# Patient Record
Sex: FEMALE | Race: WHITE | NOT HISPANIC OR LATINO | Employment: UNEMPLOYED | URBAN - METROPOLITAN AREA
[De-identification: names, ages, dates, MRNs, and addresses within clinical notes are randomized per-mention and may not be internally consistent; named-entity substitution may affect disease eponyms.]

---

## 2017-02-21 ENCOUNTER — TRANSCRIBE ORDERS (OUTPATIENT)
Dept: ADMINISTRATIVE | Facility: HOSPITAL | Age: 72
End: 2017-02-21

## 2017-02-21 DIAGNOSIS — R10.9 ABDOMINAL PAIN, UNSPECIFIED SITE: Primary | ICD-10-CM

## 2017-02-22 ENCOUNTER — APPOINTMENT (OUTPATIENT)
Dept: LAB | Facility: HOSPITAL | Age: 72
End: 2017-02-22
Payer: MEDICARE

## 2017-02-22 ENCOUNTER — HOSPITAL ENCOUNTER (OUTPATIENT)
Dept: CT IMAGING | Facility: HOSPITAL | Age: 72
Discharge: HOME | End: 2017-02-22
Payer: MEDICARE

## 2017-02-22 ENCOUNTER — TRANSCRIBE ORDERS (OUTPATIENT)
Dept: ADMINISTRATIVE | Facility: HOSPITAL | Age: 72
End: 2017-02-22

## 2017-02-22 DIAGNOSIS — R10.9 ABDOMINAL PAIN, UNSPECIFIED SITE: ICD-10-CM

## 2017-02-22 DIAGNOSIS — R10.9 ABDOMINAL PAIN, UNSPECIFIED SITE: Primary | ICD-10-CM

## 2017-02-22 LAB
BUN SERPL-MCNC: 12 MG/DL (ref 5–25)
CREAT SERPL-MCNC: 0.99 MG/DL (ref 0.6–1.3)
GFR SERPL CREATININE-BSD FRML MDRD: 55 ML/MIN/1.73SQ M

## 2017-02-22 PROCEDURE — 36415 COLL VENOUS BLD VENIPUNCTURE: CPT

## 2017-02-22 PROCEDURE — 84520 ASSAY OF UREA NITROGEN: CPT

## 2017-02-22 PROCEDURE — 74177 CT ABD & PELVIS W/CONTRAST: CPT

## 2017-02-22 PROCEDURE — 82565 ASSAY OF CREATININE: CPT

## 2017-02-22 RX ADMIN — IOHEXOL 50 ML: 240 INJECTION, SOLUTION INTRATHECAL; INTRAVASCULAR; INTRAVENOUS; ORAL at 12:08

## 2017-02-22 RX ADMIN — IOHEXOL 100 ML: 350 INJECTION, SOLUTION INTRAVENOUS at 12:08

## 2017-03-08 ENCOUNTER — TRANSCRIBE ORDERS (OUTPATIENT)
Dept: ADMINISTRATIVE | Facility: HOSPITAL | Age: 72
End: 2017-03-08

## 2017-03-08 DIAGNOSIS — Q63.8 OTHER SPECIFIED CONGENITAL ANOMALY OF KIDNEY: Primary | ICD-10-CM

## 2017-03-08 DIAGNOSIS — M54.30 SCIATICA, UNSPECIFIED LATERALITY: ICD-10-CM

## 2017-03-08 DIAGNOSIS — M54.12 BRACHIAL NEURITIS OR RADICULITIS NOS: Primary | ICD-10-CM

## 2017-03-20 ENCOUNTER — HOSPITAL ENCOUNTER (OUTPATIENT)
Dept: ULTRASOUND IMAGING | Facility: HOSPITAL | Age: 72
Discharge: HOME | End: 2017-03-20
Payer: MEDICARE

## 2017-03-20 ENCOUNTER — HOSPITAL ENCOUNTER (OUTPATIENT)
Dept: MRI IMAGING | Facility: HOSPITAL | Age: 72
Discharge: HOME | End: 2017-03-20
Payer: MEDICARE

## 2017-03-20 ENCOUNTER — HOSPITAL ENCOUNTER (OUTPATIENT)
Dept: ULTRASOUND IMAGING | Facility: HOSPITAL | Age: 72
Discharge: HOME | End: 2017-03-20
Attending: INTERNAL MEDICINE
Payer: MEDICARE

## 2017-03-20 DIAGNOSIS — M54.30 SCIATICA, UNSPECIFIED LATERALITY: ICD-10-CM

## 2017-03-20 DIAGNOSIS — Q63.8 OTHER SPECIFIED CONGENITAL ANOMALY OF KIDNEY: ICD-10-CM

## 2017-03-20 DIAGNOSIS — R93.89 THICKENED ENDOMETRIUM: ICD-10-CM

## 2017-03-20 DIAGNOSIS — M54.12 BRACHIAL NEURITIS OR RADICULITIS NOS: ICD-10-CM

## 2017-03-20 PROCEDURE — 72148 MRI LUMBAR SPINE W/O DYE: CPT

## 2017-03-20 PROCEDURE — 76770 US EXAM ABDO BACK WALL COMP: CPT

## 2017-03-20 PROCEDURE — 76856 US EXAM PELVIC COMPLETE: CPT

## 2017-03-20 PROCEDURE — 72141 MRI NECK SPINE W/O DYE: CPT

## 2017-03-20 PROCEDURE — 76830 TRANSVAGINAL US NON-OB: CPT

## 2017-05-26 ENCOUNTER — GENERIC CONVERSION - ENCOUNTER (OUTPATIENT)
Dept: OTHER | Facility: OTHER | Age: 72
End: 2017-05-26

## 2017-06-20 ENCOUNTER — ALLSCRIPTS OFFICE VISIT (OUTPATIENT)
Dept: OTHER | Facility: OTHER | Age: 72
End: 2017-06-20

## 2017-06-20 ENCOUNTER — LAB REQUISITION (OUTPATIENT)
Dept: LAB | Facility: HOSPITAL | Age: 72
End: 2017-06-20
Payer: MEDICARE

## 2017-06-20 DIAGNOSIS — R93.89 ABNORMAL FINDINGS ON DIAGNOSTIC IMAGING OF OTHER SPECIFIED BODY STRUCTURES: ICD-10-CM

## 2017-06-20 PROCEDURE — 88305 TISSUE EXAM BY PATHOLOGIST: CPT | Performed by: OBSTETRICS & GYNECOLOGY

## 2017-07-03 ENCOUNTER — ALLSCRIPTS OFFICE VISIT (OUTPATIENT)
Dept: OTHER | Facility: OTHER | Age: 72
End: 2017-07-03

## 2018-01-13 VITALS
WEIGHT: 145 LBS | DIASTOLIC BLOOD PRESSURE: 74 MMHG | SYSTOLIC BLOOD PRESSURE: 130 MMHG | HEIGHT: 69 IN | BODY MASS INDEX: 21.48 KG/M2

## 2018-01-14 VITALS
HEIGHT: 69 IN | BODY MASS INDEX: 21.33 KG/M2 | DIASTOLIC BLOOD PRESSURE: 80 MMHG | SYSTOLIC BLOOD PRESSURE: 144 MMHG | WEIGHT: 144 LBS

## 2018-01-15 NOTE — MISCELLANEOUS
Message  Patient called regarding possible endometrial biopsy  She has never been seen by our office  Pelvic U/S was ordered by her PCP for abdominal pain  Note was made of 11 mm endometrium, as well as an endometrial nodule  Patient was then referred to Dr Yolis Douglas office  Dr Thao Harrison recommended that patient have follow-up with a GYN  Explained to patient that I agreed with Dr Yolis Douglas assessment that f/u of U/S was needed, but I was uncomfortable counseling a patient that had never been seen here  Recommended a consult visit where a good history could be taken, the procedure explained, and any questions answered  Did tell patient that endometrial biopsy is an in office procedure  Again stressed the need for a consult  Patient very nervous and unsure  Explained once again that a consult visit would be very helpful in allaying her anxiety  She states she will speak with her  and decide what to do        Signatures   Electronically signed by : Marilee [facility]; May 26 2017  3:16PM EST                       (Author)

## 2019-01-17 RX ORDER — LEVOTHYROXINE SODIUM ANHYDROUS 200 UG/5ML
INJECTION, POWDER, LYOPHILIZED, FOR SOLUTION INTRAVENOUS
COMMUNITY
End: 2019-05-30 | Stop reason: ALTCHOICE

## 2019-01-17 RX ORDER — CARVEDILOL 12.5 MG/1
TABLET ORAL
COMMUNITY
End: 2019-06-06 | Stop reason: SDUPTHER

## 2019-01-17 RX ORDER — SERTRALINE HYDROCHLORIDE 25 MG/1
TABLET, FILM COATED ORAL
COMMUNITY
End: 2019-10-14

## 2019-01-22 ENCOUNTER — OFFICE VISIT (OUTPATIENT)
Dept: CARDIOLOGY CLINIC | Facility: CLINIC | Age: 74
End: 2019-01-22
Payer: MEDICARE

## 2019-01-22 VITALS
RESPIRATION RATE: 18 BRPM | WEIGHT: 145 LBS | HEIGHT: 69 IN | BODY MASS INDEX: 21.48 KG/M2 | DIASTOLIC BLOOD PRESSURE: 80 MMHG | SYSTOLIC BLOOD PRESSURE: 130 MMHG | HEART RATE: 70 BPM

## 2019-01-22 DIAGNOSIS — I48.0 PAROXYSMAL ATRIAL FIBRILLATION (HCC): ICD-10-CM

## 2019-01-22 DIAGNOSIS — E78.2 MIXED HYPERLIPIDEMIA: ICD-10-CM

## 2019-01-22 DIAGNOSIS — I42.9 CARDIOMYOPATHY, UNSPECIFIED TYPE (HCC): Primary | ICD-10-CM

## 2019-01-22 DIAGNOSIS — E03.9 HYPOTHYROIDISM, UNSPECIFIED TYPE: ICD-10-CM

## 2019-01-22 PROCEDURE — 99213 OFFICE O/P EST LOW 20 MIN: CPT | Performed by: INTERNAL MEDICINE

## 2019-01-22 RX ORDER — EZETIMIBE 10 MG/1
TABLET ORAL
COMMUNITY
Start: 2018-11-26 | End: 2019-12-14 | Stop reason: SDUPTHER

## 2019-01-22 NOTE — PATIENT INSTRUCTIONS
The patient was advised to discontinue Zetia for the time being and reassess her symptoms in a few weeks  She is scheduled for vitamin-D level and the TSH  She will otherwise continue on the same regimen of medications

## 2019-01-22 NOTE — ASSESSMENT & PLAN NOTE
Hyperlipidemia, stable most recent lab shows adequate response it was done in May of 2018  Question if muscle aching and weakness is related to current medications  Patient was taken off Livalo without change in the pattern of symptoms  We will try stopping Zetia for a few weeks and see if this has any affect

## 2019-01-22 NOTE — LETTER
January 22, 2019     [place], [address] [id number]    Patient: Alexus Sepulveda   YOB: 1945   Date of Visit: 1/22/2019       Dear Dr Alicia Thakkar:    Thank you for referring Errol Goyal to me for evaluation  Below are my notes for this consultation  If you have questions, please do not hesitate to call me  I look forward to following your patient along with you  Sincerely,        Sae Guevara MD        CC: No Recipients  Sae Guevara MD  1/22/2019  3:16 PM  Sign at close encounter  Assessment/Plan:    Cardiomyopathy [facility])  History of cardiomyopathy, stable most recent echocardiogram 2 and half years ago showed normal ejection fraction  We will continue the same  Paroxysmal atrial fibrillation (HCC)  History of paroxysmal atrial fibrillation in the context of hyperthyroidism, resolved  Plan to continue same medication but we will check TSH  Mixed hyperlipidemia  Hyperlipidemia, stable most recent lab shows adequate response it was done in May of 2018  Question if muscle aching and weakness is related to current medications  Patient was taken off Livalo without change in the pattern of symptoms  We will try stopping Zetia for a few weeks and see if this has any affect  Diagnoses and all orders for this visit:    Cardiomyopathy, unspecified type ([address] )  -     Vitamin D 25 hydroxy    Mixed hyperlipidemia  -     pitavastatin (LIVALO) 2 mg; Take 1 tablet (2 mg total) by mouth daily with dinner    Paroxysmal atrial fibrillation (HCC)    Hypothyroidism, unspecified type  -     TSH, 3rd generation    Other orders  -     carvedilol (COREG) 12 5 mg tablet; Take by mouth  -     sertraline (ZOLOFT) 25 mg tablet; Take by mouth  -     Levothyroxine Sodium 200 MCG; Inject as directed  -     cholecalciferol (VITAMIN D3) 1,000 units tablet;  Take by mouth  -     ezetimibe (ZETIA) 10 mg tablet;           Subjective:  Patient feels generally well but for periods of muscle aching and weakness  Patient ID: Gena Huang is a 74 y o  female  Patient presented to this office for the purpose of cardiac follow-up  She has a history of cardiomyopathy and congestive heart failure  She also has a history of hypertension, and hyperlipidemia  She has been treated for anxiety depression  She complains of muscle and leg weakness which is intermittent with some muscle aching especially at night  She denies any symptoms of chest pain, shortness of breath, palpitation, dizziness or lightheadedness  She has no leg edema  The following portions of the patient's history were reviewed and updated as appropriate: allergies, current medications, past family history, past medical history, past social history, past surgical history and problem list     Review of Systems   Respiratory: Negative for apnea, cough, chest tightness, shortness of breath and wheezing  Cardiovascular: Negative for chest pain, palpitations and leg swelling  Gastrointestinal: Negative for abdominal pain  Musculoskeletal: Positive for arthralgias  Neurological: Negative for dizziness and light-headedness  Objective:  Stable cardiac-wise  /80 (BP Location: Right arm, Patient Position: Sitting)   Pulse 70   Resp 18   Ht 5' 9" (1 753 m)   Wt 65 8 kg (145 lb)   BMI 21 41 kg/m²           Physical Exam   Constitutional: She is oriented to person, place, and time  She appears well-developed and well-nourished  No distress  HENT:   Head: Normocephalic and atraumatic  Neck: Normal range of motion  Neck supple  No JVD present  No thyromegaly present  Cardiovascular: Normal rate, regular rhythm, S1 normal and S2 normal   Exam reveals no gallop and no friction rub  No murmur heard  Pulmonary/Chest: Effort normal  No respiratory distress  She has no wheezes  She has no rales  She exhibits no tenderness  Abdominal: Soft  Musculoskeletal: She exhibits no edema     Neurological: She is alert and oriented to person, place, and time  Skin: Skin is warm and dry  She is not diaphoretic  Psychiatric: She has a normal mood and affect  Vitals reviewed

## 2019-01-22 NOTE — ASSESSMENT & PLAN NOTE
History of paroxysmal atrial fibrillation in the context of hyperthyroidism, resolved  Plan to continue same medication but we will check TSH

## 2019-01-22 NOTE — PROGRESS NOTES
Assessment/Plan:    Cardiomyopathy ([facility] )  History of cardiomyopathy, stable most recent echocardiogram 2 and half years ago showed normal ejection fraction  We will continue the same  Paroxysmal atrial fibrillation (HCC)  History of paroxysmal atrial fibrillation in the context of hyperthyroidism, resolved  Plan to continue same medication but we will check TSH  Mixed hyperlipidemia  Hyperlipidemia, stable most recent lab shows adequate response it was done in May of 2018  Question if muscle aching and weakness is related to current medications  Patient was taken off Livalo without change in the pattern of symptoms  We will try stopping Zetia for a few weeks and see if this has any affect  Diagnoses and all orders for this visit:    Cardiomyopathy, unspecified type ([facility] )  -     Vitamin D 25 hydroxy    Mixed hyperlipidemia  -     pitavastatin (LIVALO) 2 mg; Take 1 tablet (2 mg total) by mouth daily with dinner    Paroxysmal atrial fibrillation (HCC)    Hypothyroidism, unspecified type  -     TSH, 3rd generation    Other orders  -     carvedilol (COREG) 12 5 mg tablet; Take by mouth  -     sertraline (ZOLOFT) 25 mg tablet; Take by mouth  -     Levothyroxine Sodium 200 MCG; Inject as directed  -     cholecalciferol (VITAMIN D3) 1,000 units tablet; Take by mouth  -     ezetimibe (ZETIA) 10 mg tablet;           Subjective:  Patient feels generally well but for periods of muscle aching and weakness  Patient ID: Rosemary Ruff is a 74 y o  female  Patient presented to this office for the purpose of cardiac follow-up  She has a history of cardiomyopathy and congestive heart failure  She also has a history of hypertension, and hyperlipidemia  She has been treated for anxiety depression  She complains of muscle and leg weakness which is intermittent with some muscle aching especially at night  She denies any symptoms of chest pain, shortness of breath, palpitation, dizziness or lightheadedness  She has no leg edema  The following portions of the patient's history were reviewed and updated as appropriate: allergies, current medications, past family history, past medical history, past social history, past surgical history and problem list     Review of Systems   Respiratory: Negative for apnea, cough, chest tightness, shortness of breath and wheezing  Cardiovascular: Negative for chest pain, palpitations and leg swelling  Gastrointestinal: Negative for abdominal pain  Musculoskeletal: Positive for arthralgias  Neurological: Negative for dizziness and light-headedness  Objective:  Stable cardiac-wise  /80 (BP Location: Right arm, Patient Position: Sitting)   Pulse 70   Resp 18   Ht 5' 9" (1 753 m)   Wt 65 8 kg (145 lb)   BMI 21 41 kg/m²          Physical Exam   Constitutional: She is oriented to person, place, and time  She appears well-developed and well-nourished  No distress  HENT:   Head: Normocephalic and atraumatic  Neck: Normal range of motion  Neck supple  No JVD present  No thyromegaly present  Cardiovascular: Normal rate, regular rhythm, S1 normal and S2 normal   Exam reveals no gallop and no friction rub  No murmur heard  Pulmonary/Chest: Effort normal  No respiratory distress  She has no wheezes  She has no rales  She exhibits no tenderness  Abdominal: Soft  Musculoskeletal: She exhibits no edema  Neurological: She is alert and oriented to person, place, and time  Skin: Skin is warm and dry  She is not diaphoretic  Psychiatric: She has a normal mood and affect  Vitals reviewed

## 2019-01-22 NOTE — ASSESSMENT & PLAN NOTE
History of cardiomyopathy, stable most recent echocardiogram 2 and half years ago showed normal ejection fraction  We will continue the same

## 2019-05-05 DIAGNOSIS — F41.9 ANXIETY: Primary | ICD-10-CM

## 2019-05-06 RX ORDER — SERTRALINE HYDROCHLORIDE 100 MG/1
TABLET, FILM COATED ORAL
Qty: 90 TABLET | Refills: 3 | Status: SHIPPED | OUTPATIENT
Start: 2019-05-06 | End: 2020-06-26

## 2019-05-30 ENCOUNTER — OFFICE VISIT (OUTPATIENT)
Dept: CARDIOLOGY CLINIC | Facility: CLINIC | Age: 74
End: 2019-05-30
Payer: MEDICARE

## 2019-05-30 VITALS
HEIGHT: 69 IN | BODY MASS INDEX: 22.04 KG/M2 | WEIGHT: 148.8 LBS | SYSTOLIC BLOOD PRESSURE: 140 MMHG | HEART RATE: 60 BPM | DIASTOLIC BLOOD PRESSURE: 80 MMHG | RESPIRATION RATE: 18 BRPM

## 2019-05-30 DIAGNOSIS — R53.83 FATIGUE, UNSPECIFIED TYPE: ICD-10-CM

## 2019-05-30 DIAGNOSIS — F32.A ANXIETY AND DEPRESSION: ICD-10-CM

## 2019-05-30 DIAGNOSIS — I42.9 CARDIOMYOPATHY, UNSPECIFIED TYPE (HCC): Primary | ICD-10-CM

## 2019-05-30 DIAGNOSIS — E78.2 MIXED HYPERLIPIDEMIA: ICD-10-CM

## 2019-05-30 DIAGNOSIS — F41.9 ANXIETY AND DEPRESSION: ICD-10-CM

## 2019-05-30 DIAGNOSIS — I48.0 PAROXYSMAL ATRIAL FIBRILLATION (HCC): ICD-10-CM

## 2019-05-30 PROCEDURE — 99214 OFFICE O/P EST MOD 30 MIN: CPT | Performed by: INTERNAL MEDICINE

## 2019-05-30 RX ORDER — LEVOTHYROXINE SODIUM 0.1 MG/1
TABLET ORAL
COMMUNITY
Start: 2019-05-11 | End: 2019-06-06 | Stop reason: SDUPTHER

## 2019-06-06 DIAGNOSIS — I10 ESSENTIAL (PRIMARY) HYPERTENSION: Primary | ICD-10-CM

## 2019-06-06 DIAGNOSIS — E78.00 PURE HYPERCHOLESTEROLEMIA: ICD-10-CM

## 2019-06-06 DIAGNOSIS — E78.2 MIXED HYPERLIPIDEMIA: ICD-10-CM

## 2019-06-06 RX ORDER — CARVEDILOL 12.5 MG/1
TABLET ORAL
Qty: 180 TABLET | Refills: 3 | Status: SHIPPED | OUTPATIENT
Start: 2019-06-06 | End: 2020-07-01 | Stop reason: SDUPTHER

## 2019-06-06 RX ORDER — PITAVASTATIN CALCIUM 2.09 MG/1
TABLET, FILM COATED ORAL
Qty: 90 TABLET | Refills: 3 | Status: SHIPPED | OUTPATIENT
Start: 2019-06-06 | End: 2020-04-16 | Stop reason: SDUPTHER

## 2019-06-06 RX ORDER — LEVOTHYROXINE SODIUM 0.1 MG/1
TABLET ORAL
Qty: 90 TABLET | Refills: 3 | Status: SHIPPED | OUTPATIENT
Start: 2019-06-06 | End: 2020-10-09 | Stop reason: SDUPTHER

## 2019-07-03 ENCOUNTER — HOSPITAL ENCOUNTER (OUTPATIENT)
Dept: NON INVASIVE DIAGNOSTICS | Facility: CLINIC | Age: 74
Discharge: HOME | End: 2019-07-03
Payer: MEDICARE

## 2019-07-03 DIAGNOSIS — I42.9 CARDIOMYOPATHY, UNSPECIFIED TYPE (HCC): ICD-10-CM

## 2019-07-03 PROCEDURE — 93016 CV STRESS TEST SUPVJ ONLY: CPT | Performed by: INTERNAL MEDICINE

## 2019-07-03 PROCEDURE — 93018 CV STRESS TEST I&R ONLY: CPT | Performed by: INTERNAL MEDICINE

## 2019-07-03 PROCEDURE — 93017 CV STRESS TEST TRACING ONLY: CPT

## 2019-07-05 LAB
CHEST PAIN STATEMENT: NORMAL
MAX DIASTOLIC BP: 76 MMHG
MAX HEART RATE: 121 BPM
MAX PREDICTED HEART RATE: 145 BPM
MAX. SYSTOLIC BP: 190 MMHG
PROTOCOL NAME: NORMAL
REASON FOR TERMINATION: NORMAL
TARGET HR FORMULA: NORMAL
TEST INDICATION: NORMAL
TIME IN EXERCISE PHASE: NORMAL

## 2019-07-10 ENCOUNTER — HOSPITAL ENCOUNTER (OUTPATIENT)
Dept: NON INVASIVE DIAGNOSTICS | Facility: HOSPITAL | Age: 74
Discharge: HOME | End: 2019-07-10
Attending: INTERNAL MEDICINE
Payer: MEDICARE

## 2019-07-10 DIAGNOSIS — E78.2 MIXED HYPERLIPIDEMIA: ICD-10-CM

## 2019-07-10 DIAGNOSIS — I42.9 CARDIOMYOPATHY, UNSPECIFIED TYPE (HCC): ICD-10-CM

## 2019-07-10 PROCEDURE — 93306 TTE W/DOPPLER COMPLETE: CPT

## 2019-07-10 PROCEDURE — 93306 TTE W/DOPPLER COMPLETE: CPT | Performed by: INTERNAL MEDICINE

## 2019-10-14 ENCOUNTER — OFFICE VISIT (OUTPATIENT)
Dept: CARDIOLOGY CLINIC | Facility: CLINIC | Age: 74
End: 2019-10-14
Payer: MEDICARE

## 2019-10-14 VITALS
SYSTOLIC BLOOD PRESSURE: 120 MMHG | DIASTOLIC BLOOD PRESSURE: 70 MMHG | HEART RATE: 60 BPM | RESPIRATION RATE: 18 BRPM | HEIGHT: 69 IN | WEIGHT: 146.4 LBS | BODY MASS INDEX: 21.68 KG/M2

## 2019-10-14 DIAGNOSIS — I42.9 CARDIOMYOPATHY, UNSPECIFIED TYPE (HCC): Primary | ICD-10-CM

## 2019-10-14 DIAGNOSIS — E78.2 MIXED HYPERLIPIDEMIA: ICD-10-CM

## 2019-10-14 DIAGNOSIS — I48.0 PAROXYSMAL ATRIAL FIBRILLATION (HCC): ICD-10-CM

## 2019-10-14 PROCEDURE — 99213 OFFICE O/P EST LOW 20 MIN: CPT | Performed by: INTERNAL MEDICINE

## 2019-10-14 NOTE — ASSESSMENT & PLAN NOTE
History of paroxysmal atrial fibrillation, stable  The patient is in regular rhythm and is asymptomatic

## 2019-10-14 NOTE — PROGRESS NOTES
Assessment/Plan:    Mixed hyperlipidemia  Mixed hyperlipidemia, stable  The patient will continue Livalo 2 mg daily and Zetia 10 mg daily  Paroxysmal atrial fibrillation (HCC)  History of paroxysmal atrial fibrillation, stable  The patient is in regular rhythm and is asymptomatic  Cardiomyopathy ([facility] )  History of cardiomyopathy, stable with no evidence of heart failure  Diagnoses and all orders for this visit:    Cardiomyopathy, unspecified type ([facility] )    Paroxysmal atrial fibrillation ([facility] )    Mixed hyperlipidemia          Subjective:  Feels well from the cardiac standpoint  Mild dyspnea on exertion  Mostly hip and knee pain  Patient ID: Anika Ann is a 74 y o  female  The patient presented to this office for the purpose of cardiac follow-up  She has a known history of cardiomyopathy with paroxysmal atrial fibrillation  She has been stable from the cardiac standpoint denying any symptom of chest pain  She has mild dyspnea on exertion but mostly on walking up hills  She has no leg edema  She denies any symptoms of palpitation, dizziness or lightheadedness  The following portions of the patient's history were reviewed and updated as appropriate: allergies, current medications, past family history, past medical history, past social history, past surgical history and problem list     Review of Systems   Respiratory: Positive for shortness of breath  Negative for apnea, cough, chest tightness and wheezing  Cardiovascular: Negative for chest pain, palpitations and leg swelling  Gastrointestinal: Negative for abdominal pain  Neurological: Negative for dizziness and light-headedness  Objective:  Stable cardiac-wise  /70 (BP Location: Right arm, Patient Position: Sitting)   Pulse 60   Resp 18   Ht 5' 9" (1 753 m)   Wt 66 4 kg (146 lb 6 4 oz)   BMI 21 62 kg/m²          Physical Exam   Constitutional: She is oriented to person, place, and time   She appears well-developed and well-nourished  No distress  HENT:   Head: Normocephalic and atraumatic  Neck: Normal range of motion  Neck supple  No JVD present  No thyromegaly present  Cardiovascular: Normal rate, regular rhythm, S1 normal and S2 normal  Exam reveals no gallop and no friction rub  No murmur heard  Pulmonary/Chest: Effort normal  No respiratory distress  She has no wheezes  She has no rales  She exhibits no tenderness  Abdominal: Soft  Musculoskeletal: She exhibits no edema  Neurological: She is alert and oriented to person, place, and time  Skin: Skin is warm and dry  She is not diaphoretic  Psychiatric: She has a normal mood and affect  Vitals reviewed

## 2019-10-14 NOTE — LETTER
October 14, 2019     Chayito Urias, DO  [address] [facility]  [place] [address] [address]    Patient: Anjel Grant   YOB: 1945   Date of Visit: 10/14/2019       Dear Dr Vance Hearing:    Thank you for referring Dania Davis to me for evaluation  Below are my notes for this consultation  If you have questions, please do not hesitate to call me  I look forward to following your patient along with you  Sincerely,        Joana Severin, MD        CC: No Recipients  Joana Severin, MD  10/14/2019 12:05 PM  Sign at close encounter  Assessment/Plan:    Mixed hyperlipidemia  Mixed hyperlipidemia, stable  The patient will continue Livalo 2 mg daily and Zetia 10 mg daily  Paroxysmal atrial fibrillation (HCC)  History of paroxysmal atrial fibrillation, stable  The patient is in regular rhythm and is asymptomatic  Cardiomyopathy ([address] )  History of cardiomyopathy, stable with no evidence of heart failure  Diagnoses and all orders for this visit:    Cardiomyopathy, unspecified type ([address] )    Paroxysmal atrial fibrillation ([address] )    Mixed hyperlipidemia          Subjective:  Feels well from the cardiac standpoint  Mild dyspnea on exertion  Mostly hip and knee pain  Patient ID: Anjel Grant is a 74 y o  female  The patient presented to this office for the purpose of cardiac follow-up  She has a known history of cardiomyopathy with paroxysmal atrial fibrillation  She has been stable from the cardiac standpoint denying any symptom of chest pain  She has mild dyspnea on exertion but mostly on walking up hills  She has no leg edema  She denies any symptoms of palpitation, dizziness or lightheadedness        The following portions of the patient's history were reviewed and updated as appropriate: allergies, current medications, past family history, past medical history, past social history, past surgical history and problem list     Review of Systems   Respiratory: Positive for shortness of breath  Negative for apnea, cough, chest tightness and wheezing  Cardiovascular: Negative for chest pain, palpitations and leg swelling  Gastrointestinal: Negative for abdominal pain  Neurological: Negative for dizziness and light-headedness  Objective:  Stable cardiac-wise  /70 (BP Location: Right arm, Patient Position: Sitting)   Pulse 60   Resp 18   Ht 5' 9" (1 753 m)   Wt 66 4 kg (146 lb 6 4 oz)   BMI 21 62 kg/m²           Physical Exam   Constitutional: She is oriented to person, place, and time  She appears well-developed and well-nourished  No distress  HENT:   Head: Normocephalic and atraumatic  Neck: Normal range of motion  Neck supple  No JVD present  No thyromegaly present  Cardiovascular: Normal rate, regular rhythm, S1 normal and S2 normal  Exam reveals no gallop and no friction rub  No murmur heard  Pulmonary/Chest: Effort normal  No respiratory distress  She has no wheezes  She has no rales  She exhibits no tenderness  Abdominal: Soft  Musculoskeletal: She exhibits no edema  Neurological: She is alert and oriented to person, place, and time  Skin: Skin is warm and dry  She is not diaphoretic  Psychiatric: She has a normal mood and affect  Vitals reviewed

## 2019-12-14 DIAGNOSIS — E78.2 MIXED HYPERLIPIDEMIA: Primary | ICD-10-CM

## 2019-12-14 RX ORDER — EZETIMIBE 10 MG/1
TABLET ORAL
Qty: 90 TABLET | Refills: 3 | Status: SHIPPED | OUTPATIENT
Start: 2019-12-14 | End: 2021-01-11 | Stop reason: SDUPTHER

## 2020-01-15 ENCOUNTER — OFFICE VISIT (OUTPATIENT)
Dept: CARDIOLOGY CLINIC | Facility: CLINIC | Age: 75
End: 2020-01-15
Payer: MEDICARE

## 2020-01-15 VITALS
WEIGHT: 150.2 LBS | HEIGHT: 69 IN | OXYGEN SATURATION: 97 % | DIASTOLIC BLOOD PRESSURE: 70 MMHG | SYSTOLIC BLOOD PRESSURE: 115 MMHG | HEART RATE: 65 BPM | BODY MASS INDEX: 22.25 KG/M2 | RESPIRATION RATE: 18 BRPM

## 2020-01-15 DIAGNOSIS — I42.9 CARDIOMYOPATHY, UNSPECIFIED TYPE (HCC): Primary | ICD-10-CM

## 2020-01-15 DIAGNOSIS — F32.A ANXIETY AND DEPRESSION: ICD-10-CM

## 2020-01-15 DIAGNOSIS — E78.2 MIXED HYPERLIPIDEMIA: ICD-10-CM

## 2020-01-15 DIAGNOSIS — I48.0 PAROXYSMAL ATRIAL FIBRILLATION (HCC): ICD-10-CM

## 2020-01-15 DIAGNOSIS — F41.9 ANXIETY AND DEPRESSION: ICD-10-CM

## 2020-01-15 PROCEDURE — 99213 OFFICE O/P EST LOW 20 MIN: CPT | Performed by: INTERNAL MEDICINE

## 2020-01-15 NOTE — ASSESSMENT & PLAN NOTE
History of anxiety and depression, less than optimally controlled  The patient will continue centrally at 100 mg daily  The patient may benefit from psychiatric evaluation to explore other options

## 2020-01-15 NOTE — LETTER
January 15, 2020     Martine [place], DO  [address] [facility]  [place] [address] [address]    Patient: Blake Yusuf   YOB: 1945   Date of Visit: 1/15/2020       Dear Dr Yovany Car:    Thank you for referring Desiree Mcginnis to me for evaluation  Below are my notes for this consultation  If you have questions, please do not hesitate to call me  I look forward to following your patient along with you  Sincerely,        Sofi Ricci MD        CC: No Recipients  Sofi Ricci MD  1/15/2020 11:51 AM  Sign at close encounter  Assessment/Plan:    Cardiomyopathy [facility])  A history of dilated cardiomyopathy, stable  Patient's latest ejection fraction had normalized  She is asymptomatic in this regard with no symptoms of dyspnea or any evidence of leg edema  We will continue observation and current medications  Paroxysmal atrial fibrillation (HCC)  History of paroxysmal atrial fibrillation, stable  The patient is in regular rhythm  Mixed hyperlipidemia  Hyperlipidemia, stable  The patient will continue Livalo at 2 mg daily  Anxiety and depression  History of anxiety and depression, less than optimally controlled  The patient will continue centrally at 100 mg daily  The patient may benefit from psychiatric evaluation to explore other options  Diagnoses and all orders for this visit:    Cardiomyopathy, unspecified type ([address] )    Paroxysmal atrial fibrillation (HCC)    Mixed hyperlipidemia    Anxiety and depression          Subjective:  Easy fatigability  Patient ID: Blake Yusuf is a 66 y o  female  Patient presented to this office for the purpose of cardiac follow-up  She has a known history of cardiomyopathy paroxysmal atrial fibrillation  She has been feeling tired and somewhat depressed  She denies however any symptoms of chest pain, shortness of breath palpitation, dizziness or lightheadedness  She has no leg edema        The following portions of the patient's history were reviewed and updated as appropriate: allergies, current medications, past family history, past medical history, past social history, past surgical history and problem list     Review of Systems   Respiratory: Negative for apnea, cough, chest tightness, shortness of breath and wheezing  Cardiovascular: Negative for chest pain, palpitations and leg swelling  Gastrointestinal: Negative for abdominal pain  Neurological: Negative for dizziness and light-headedness  Hematological: Negative  Objective:  Stable cardiac-wise  Somewhat depressed  /70 (BP Location: Right arm, Patient Position: Sitting)   Pulse 65   Resp 18   Ht 5' 9" (1 753 m)   Wt 68 1 kg (150 lb 3 2 oz)   SpO2 97%   BMI 22 18 kg/m²           Physical Exam   Constitutional: She is oriented to person, place, and time  She appears well-developed and well-nourished  No distress  HENT:   Head: Normocephalic and atraumatic  Neck: Normal range of motion  Neck supple  No JVD present  No thyromegaly present  Cardiovascular: Normal rate, regular rhythm, S1 normal and S2 normal  Exam reveals no gallop and no friction rub  No murmur heard  Pulmonary/Chest: Effort normal  No respiratory distress  She has no wheezes  She has no rales  She exhibits no tenderness  Abdominal: Soft  Musculoskeletal: She exhibits no edema  Neurological: She is alert and oriented to person, place, and time  Skin: Skin is warm and dry  She is not diaphoretic  Psychiatric: She has a normal mood and affect  Vitals reviewed

## 2020-01-15 NOTE — ASSESSMENT & PLAN NOTE
A history of dilated cardiomyopathy, stable  Patient's latest ejection fraction had normalized  She is asymptomatic in this regard with no symptoms of dyspnea or any evidence of leg edema  We will continue observation and current medications

## 2020-02-12 ENCOUNTER — OFFICE VISIT (OUTPATIENT)
Dept: OBGYN CLINIC | Facility: CLINIC | Age: 75
End: 2020-02-12
Payer: MEDICARE

## 2020-02-12 VITALS
HEIGHT: 69 IN | WEIGHT: 150 LBS | HEART RATE: 63 BPM | SYSTOLIC BLOOD PRESSURE: 136 MMHG | DIASTOLIC BLOOD PRESSURE: 81 MMHG | BODY MASS INDEX: 22.22 KG/M2

## 2020-02-12 DIAGNOSIS — M54.16 LUMBAR RADICULOPATHY: ICD-10-CM

## 2020-02-12 DIAGNOSIS — M48.061 SPINAL STENOSIS OF LUMBAR REGION, UNSPECIFIED WHETHER NEUROGENIC CLAUDICATION PRESENT: Primary | ICD-10-CM

## 2020-02-12 PROCEDURE — 99203 OFFICE O/P NEW LOW 30 MIN: CPT | Performed by: ORTHOPAEDIC SURGERY

## 2020-02-12 RX ORDER — MELOXICAM 15 MG/1
15 TABLET ORAL DAILY
Qty: 30 TABLET | Refills: 1 | Status: SHIPPED | OUTPATIENT
Start: 2020-02-12 | End: 2020-04-02

## 2020-02-12 RX ORDER — GABAPENTIN 300 MG/1
300 CAPSULE ORAL 3 TIMES DAILY
Qty: 60 CAPSULE | Refills: 0 | Status: SHIPPED | OUTPATIENT
Start: 2020-02-12 | End: 2020-07-22 | Stop reason: SDUPTHER

## 2020-02-12 NOTE — PROGRESS NOTES
Assessment/Plan:    Multilevel lumbar stenosis  · Referral to pain management provided for injections  · Start gabapentin and mobic  · If no improvement we will consider surgical options  · Follow up as needed       Problem List Items Addressed This Visit     None      Visit Diagnoses     Spinal stenosis of lumbar region, unspecified whether neurogenic claudication present    -  Primary    Lumbar radiculopathy                Subjective:      Patient ID: Desiree Weldon is a 66 y o  female  [facility] is a 66year old female who presents to the office for evaluation of low back pain  She is experiencing low back pain that goes to her left lateral hip and down to her left foot  She describes a numbing and an elastic band sensation in her left lower extremity  She has similar symptoms on the right side with less severity  She has difficulty with ambulation  She has tried an intra-articular cortisone injection in the past without relief  She is accompanied by her  today in the office  The following portions of the patient's history were reviewed and updated as appropriate: current medications, past family history, past medical history, past social history, past surgical history and problem list     Review of Systems   Constitutional: Negative for fever and unexpected weight change  HENT: Negative for hearing loss, nosebleeds and sore throat  Eyes: Negative for pain, redness and visual disturbance  Respiratory: Negative for cough, shortness of breath and wheezing  Cardiovascular: Negative for chest pain, palpitations and leg swelling  Gastrointestinal: Negative for abdominal pain, nausea and vomiting  Endocrine: Negative for polydipsia and polyuria  Genitourinary: Negative for dysuria and hematuria  Musculoskeletal: Positive for back pain  Skin: Negative for rash and wound  Neurological: Negative for dizziness and headaches     Psychiatric/Behavioral: Negative for agitation and suicidal ideas  Objective:      /81   Pulse 63   Ht 5' 9" (1 753 m)   Wt 68 kg (150 lb)   BMI 22 15 kg/m²          Physical Exam   Constitutional: She is oriented to person, place, and time  She appears well-developed and well-nourished  HENT:   Head: Normocephalic and atraumatic  Eyes: Pupils are equal, round, and reactive to light  Neck: Neck supple  Cardiovascular: Intact distal pulses  Pulmonary/Chest: Effort normal and breath sounds normal    Neurological: She is alert and oriented to person, place, and time  Skin: Skin is warm and dry  Psychiatric: She has a normal mood and affect   Her behavior is normal        Patient ambulates without assistance  Modified straight leg raise negative bilaterally  Strength L2-S1 5/5 bilaterally  Sensation L2-S1 intact bilaterally    Imaging  Outside images were reviewed and shows stenosis most pronounced at L3-4, L4-5 and L5-S1    Scribe Attestation    I,:   [employer] am acting as a scribe while in the presence of the attending physician :        I,:   Morales Walrdon MD personally performed the services described in this documentation    as scribed in my presence :

## 2020-02-13 ENCOUNTER — TELEPHONE (OUTPATIENT)
Dept: OBGYN CLINIC | Facility: HOSPITAL | Age: 75
End: 2020-02-13

## 2020-02-13 NOTE — TELEPHONE ENCOUNTER
Patient sees Dr Salud oKlb    Patient is asking if Dr Salud Kolb thinks she needs hip replacement surgery? She said a lot was discussed during her appt and she feels a little bit confused       #[phone]

## 2020-02-27 ENCOUNTER — TELEPHONE (OUTPATIENT)
Dept: CARDIOLOGY CLINIC | Facility: CLINIC | Age: 75
End: 2020-02-27

## 2020-02-27 NOTE — TELEPHONE ENCOUNTER
The patient was tried on multiple statins in the past   They were not tolerated  He only medication that seems to be tolerated is Livalo

## 2020-02-27 NOTE — TELEPHONE ENCOUNTER
There is no information to send  It is just a notification sent stating it needs a prior authorization   You will have to call her insurance company for any other information

## 2020-03-04 ENCOUNTER — TELEPHONE (OUTPATIENT)
Dept: CARDIOLOGY CLINIC | Facility: CLINIC | Age: 75
End: 2020-03-04

## 2020-03-04 NOTE — TELEPHONE ENCOUNTER
Spoke with patient regarding the denial of Livalo 2mg- pt states she has tried all of the statins and they have all caused pains in her legs  Specific statins she has tried are atorvastatin, rosuvastatin and simvastatin   Can you please write a letter with the above information as well as pt name  member number [id number]

## 2020-03-12 ENCOUNTER — TELEPHONE (OUTPATIENT)
Dept: CARDIOLOGY CLINIC | Facility: CLINIC | Age: 75
End: 2020-03-12

## 2020-04-02 DIAGNOSIS — M48.061 SPINAL STENOSIS OF LUMBAR REGION, UNSPECIFIED WHETHER NEUROGENIC CLAUDICATION PRESENT: ICD-10-CM

## 2020-04-02 DIAGNOSIS — M54.16 LUMBAR RADICULOPATHY: ICD-10-CM

## 2020-04-02 RX ORDER — MELOXICAM 15 MG/1
TABLET ORAL
Qty: 30 TABLET | Refills: 1 | Status: SHIPPED | OUTPATIENT
Start: 2020-04-02 | End: 2020-04-14 | Stop reason: SDUPTHER

## 2020-04-14 ENCOUNTER — TELEPHONE (OUTPATIENT)
Dept: OBGYN CLINIC | Facility: HOSPITAL | Age: 75
End: 2020-04-14

## 2020-04-14 DIAGNOSIS — M54.16 LUMBAR RADICULOPATHY: ICD-10-CM

## 2020-04-14 DIAGNOSIS — M48.061 SPINAL STENOSIS OF LUMBAR REGION, UNSPECIFIED WHETHER NEUROGENIC CLAUDICATION PRESENT: ICD-10-CM

## 2020-04-14 RX ORDER — MELOXICAM 15 MG/1
15 TABLET ORAL DAILY
Qty: 30 TABLET | Refills: 1 | Status: SHIPPED | OUTPATIENT
Start: 2020-04-14 | End: 2020-08-19 | Stop reason: SDUPTHER

## 2020-04-16 DIAGNOSIS — E78.2 MIXED HYPERLIPIDEMIA: ICD-10-CM

## 2020-05-06 ENCOUNTER — CONSULT (OUTPATIENT)
Dept: PAIN MEDICINE | Facility: CLINIC | Age: 75
End: 2020-05-06
Payer: MEDICARE

## 2020-05-06 VITALS
SYSTOLIC BLOOD PRESSURE: 156 MMHG | DIASTOLIC BLOOD PRESSURE: 71 MMHG | BODY MASS INDEX: 22.15 KG/M2 | TEMPERATURE: 98.7 F | HEART RATE: 59 BPM | WEIGHT: 150 LBS

## 2020-05-06 DIAGNOSIS — M46.1 SACROILIITIS (HCC): ICD-10-CM

## 2020-05-06 DIAGNOSIS — M51.16 INTERVERTEBRAL DISC DISORDER WITH RADICULOPATHY OF LUMBAR REGION: Primary | ICD-10-CM

## 2020-05-06 DIAGNOSIS — M16.12 PRIMARY OSTEOARTHRITIS OF LEFT HIP: ICD-10-CM

## 2020-05-06 DIAGNOSIS — M48.062 LUMBAR STENOSIS WITH NEUROGENIC CLAUDICATION: ICD-10-CM

## 2020-05-06 PROCEDURE — 99204 OFFICE O/P NEW MOD 45 MIN: CPT | Performed by: ANESTHESIOLOGY

## 2020-05-12 ENCOUNTER — TELEPHONE (OUTPATIENT)
Dept: RADIOLOGY | Facility: CLINIC | Age: 75
End: 2020-05-12

## 2020-06-09 ENCOUNTER — HOSPITAL ENCOUNTER (OUTPATIENT)
Dept: RADIOLOGY | Facility: CLINIC | Age: 75
Discharge: HOME | End: 2020-06-09
Attending: ANESTHESIOLOGY | Admitting: ANESTHESIOLOGY
Payer: MEDICARE

## 2020-06-09 VITALS
DIASTOLIC BLOOD PRESSURE: 64 MMHG | SYSTOLIC BLOOD PRESSURE: 166 MMHG | TEMPERATURE: 98.4 F | HEART RATE: 54 BPM | RESPIRATION RATE: 20 BRPM | OXYGEN SATURATION: 96 %

## 2020-06-09 DIAGNOSIS — M51.16 INTERVERTEBRAL DISC DISORDER WITH RADICULOPATHY OF LUMBAR REGION: ICD-10-CM

## 2020-06-09 DIAGNOSIS — M48.062 LUMBAR STENOSIS WITH NEUROGENIC CLAUDICATION: ICD-10-CM

## 2020-06-09 PROCEDURE — 64483 NJX AA&/STRD TFRM EPI L/S 1: CPT | Performed by: ANESTHESIOLOGY

## 2020-06-09 PROCEDURE — 64484 NJX AA&/STRD TFRM EPI L/S EA: CPT | Performed by: ANESTHESIOLOGY

## 2020-06-09 RX ORDER — BUPIVACAINE HCL/PF 2.5 MG/ML
10 VIAL (ML) INJECTION ONCE
Status: COMPLETED | OUTPATIENT
Start: 2020-06-09 | End: 2020-06-09

## 2020-06-09 RX ORDER — 0.9 % SODIUM CHLORIDE 0.9 %
10 VIAL (ML) INJECTION ONCE
Status: COMPLETED | OUTPATIENT
Start: 2020-06-09 | End: 2020-06-09

## 2020-06-09 RX ORDER — METHYLPREDNISOLONE ACETATE 80 MG/ML
80 INJECTION, SUSPENSION INTRA-ARTICULAR; INTRALESIONAL; INTRAMUSCULAR; PARENTERAL; SOFT TISSUE ONCE
Status: COMPLETED | OUTPATIENT
Start: 2020-06-09 | End: 2020-06-09

## 2020-06-09 RX ADMIN — Medication 4 ML: at 11:47

## 2020-06-09 RX ADMIN — SODIUM CHLORIDE 4 ML: 9 INJECTION, SOLUTION INTRAMUSCULAR; INTRAVENOUS; SUBCUTANEOUS at 11:47

## 2020-06-09 RX ADMIN — IOHEXOL 1 ML: 300 INJECTION, SOLUTION INTRAVENOUS at 11:49

## 2020-06-09 RX ADMIN — BUPIVACAINE HYDROCHLORIDE 2 ML: 2.5 INJECTION, SOLUTION EPIDURAL; INFILTRATION; INTRACAUDAL at 11:49

## 2020-06-09 RX ADMIN — METHYLPREDNISOLONE ACETATE 80 MG: 80 INJECTION, SUSPENSION INTRA-ARTICULAR; INTRALESIONAL; INTRAMUSCULAR; PARENTERAL; SOFT TISSUE at 11:49

## 2020-06-16 ENCOUNTER — TELEPHONE (OUTPATIENT)
Dept: PAIN MEDICINE | Facility: CLINIC | Age: 75
End: 2020-06-16

## 2020-06-24 NOTE — TELEPHONE ENCOUNTER
Patient states  she has little improvement & pain level 5 or 6/10  Please be advise thank you      Patient call back #  [phone]

## 2020-06-25 NOTE — TELEPHONE ENCOUNTER
S/w pt, states she thinks someone may have been confused, as she did not speak with anyone yesterday regarding her symptoms  Pt said notation may have been describing her 's symptoms (s/w  also, documented conversation in his pt chart)  [facility] states her pain is minimal, usually 2-3/10 and she has had improvement since injection  At this time, pt not requesting further recommendations for herself

## 2020-06-26 DIAGNOSIS — F41.9 ANXIETY: ICD-10-CM

## 2020-06-26 RX ORDER — SERTRALINE HYDROCHLORIDE 100 MG/1
100 TABLET, FILM COATED ORAL DAILY
Qty: 90 TABLET | Refills: 3 | Status: SHIPPED | OUTPATIENT
Start: 2020-06-26 | End: 2021-07-12 | Stop reason: SDUPTHER

## 2020-07-01 DIAGNOSIS — I10 ESSENTIAL (PRIMARY) HYPERTENSION: ICD-10-CM

## 2020-07-01 RX ORDER — CARVEDILOL 12.5 MG/1
12.5 TABLET ORAL 2 TIMES DAILY
Qty: 180 TABLET | Refills: 3 | Status: SHIPPED | OUTPATIENT
Start: 2020-07-01 | End: 2020-07-21 | Stop reason: SDUPTHER

## 2020-07-20 ENCOUNTER — TELEPHONE (OUTPATIENT)
Dept: OBGYN CLINIC | Facility: HOSPITAL | Age: 75
End: 2020-07-20

## 2020-07-20 DIAGNOSIS — M48.061 SPINAL STENOSIS OF LUMBAR REGION, UNSPECIFIED WHETHER NEUROGENIC CLAUDICATION PRESENT: ICD-10-CM

## 2020-07-20 DIAGNOSIS — M54.16 LUMBAR RADICULOPATHY: ICD-10-CM

## 2020-07-20 NOTE — TELEPHONE ENCOUNTER
Patient sees Dr Dana Bynum  Patient called requesting a refill for the medication Gabapentin, stated having 5 remaining capsules  Want the prescription to be send to [place] [address]

## 2020-07-20 NOTE — TELEPHONE ENCOUNTER
Last rx for this that I see from Dr Candace Calderon was from Feb  Was she getting this from a different provider recently? (please send back to Candace Calderon bin)

## 2020-07-21 ENCOUNTER — OFFICE VISIT (OUTPATIENT)
Dept: CARDIOLOGY CLINIC | Facility: CLINIC | Age: 75
End: 2020-07-21
Payer: MEDICARE

## 2020-07-21 VITALS
HEART RATE: 62 BPM | HEIGHT: 69 IN | BODY MASS INDEX: 22.07 KG/M2 | WEIGHT: 149 LBS | SYSTOLIC BLOOD PRESSURE: 110 MMHG | DIASTOLIC BLOOD PRESSURE: 72 MMHG

## 2020-07-21 DIAGNOSIS — I10 ESSENTIAL (PRIMARY) HYPERTENSION: ICD-10-CM

## 2020-07-21 DIAGNOSIS — I42.9 CARDIOMYOPATHY, UNSPECIFIED TYPE (HCC): Primary | ICD-10-CM

## 2020-07-21 DIAGNOSIS — F41.9 ANXIETY AND DEPRESSION: ICD-10-CM

## 2020-07-21 DIAGNOSIS — E78.2 MIXED HYPERLIPIDEMIA: ICD-10-CM

## 2020-07-21 DIAGNOSIS — F32.A ANXIETY AND DEPRESSION: ICD-10-CM

## 2020-07-21 DIAGNOSIS — I48.0 PAROXYSMAL ATRIAL FIBRILLATION (HCC): ICD-10-CM

## 2020-07-21 PROCEDURE — 99213 OFFICE O/P EST LOW 20 MIN: CPT | Performed by: INTERNAL MEDICINE

## 2020-07-21 RX ORDER — CARVEDILOL 12.5 MG/1
12.5 TABLET ORAL 2 TIMES DAILY
Qty: 180 TABLET | Refills: 3 | Status: SHIPPED | OUTPATIENT
Start: 2020-07-21 | End: 2021-07-12 | Stop reason: SDUPTHER

## 2020-07-21 RX ORDER — LORAZEPAM 0.5 MG/1
0.5 TABLET ORAL 2 TIMES DAILY
Qty: 180 TABLET | Refills: 1 | Status: SHIPPED | OUTPATIENT
Start: 2020-07-21 | End: 2020-08-25

## 2020-07-21 NOTE — LETTER
July 21, 2020     Mireya Ríos, DO  [address] [facility]  [place] [address] [address]    Patient: Alisha Peres   YOB: 1945   Date of Visit: 7/21/2020       Dear Dr Duglas Ward:    Thank you for referring Alexandrea Solis to me for evaluation  Below are my notes for this consultation  If you have questions, please do not hesitate to call me  I look forward to following your patient along with you  Sincerely,        El Mcclendon MD        CC: No Recipients  El Mcclendon MD  7/21/2020 12:51 PM  Sign at close encounter  Assessment/Plan:    Cardiomyopathy [facility])    History of dilated cardiomyopathy  This has improved over the years and last echocardiogram showed normal ejection fraction vicinity of 60%  We will continue observation  Paroxysmal atrial fibrillation (HCC)    History of paroxysmal atrial fibrillation  This has been stable the patient is in regular rhythm  This had occurred in the context of hyperthyroidism and has since resolved  Mixed hyperlipidemia    Hyperlipidemia, stable  The patient will continue Livalo at 2 mg daily  Anxiety and depression    Evidence of anxiety and depression seems to be less than adequately controlled  The patient will continue sertraline at 100 mg daily and I will add lorazepam 0 5 mg b i d  P r n  Diagnoses and all orders for this visit:    Cardiomyopathy, unspecified type ([address] )  -     CBC and differential; Future  -     Comprehensive metabolic panel; Future  -     TSH, 3rd generation  -     Lipid Panel with Direct LDL reflex; Future    Paroxysmal atrial fibrillation (HCC)    Anxiety and depression  -     LORazepam (ATIVAN) 0 5 mg tablet; Take 1 tablet (0 5 mg total) by mouth 2 (two) times a day    Mixed hyperlipidemia    Essential (primary) hypertension  -     carvedilol (COREG) 12 5 mg tablet; Take 1 tablet (12 5 mg total) by mouth 2 (two) times a day          Subjective:   Somewhat anxious     Patient ID: Alisha Peres is a 66 y o  female  The patient presented to this office for the purpose of cardiac follow-up  She has a known history of cardiomyopathy and paroxysmal atrial fibrillation with history of in the past   The patient also had a history of anxiety depression  She has been feeling somewhat anxious listless with some difficulty sleeping  She feels shaking inside  She denies however symptoms of chest pain of breath palpitation, dizziness or lightheadedness  She has no leg edema  The following portions of the patient's history were reviewed and updated as appropriate: allergies, current medications, past family history, past medical history, past social history, past surgical history and problem list     Review of Systems   Constitutional: Positive for fatigue  Respiratory: Negative for apnea, cough, chest tightness, shortness of breath and wheezing  Cardiovascular: Negative for chest pain, palpitations and leg swelling  Gastrointestinal: Negative for abdominal pain  Neurological: Negative for dizziness and light-headedness  Hematological: Negative  Psychiatric/Behavioral: The patient is nervous/anxious  Objective: stable cardiac-wise but appears to be anxious  /72 (BP Location: Left arm, Patient Position: Sitting, Cuff Size: Adult)   Pulse 62   Ht 5' 9" (1 753 m)   Wt 67 6 kg (149 lb)   BMI 22 00 kg/m²           Physical Exam   Constitutional: She is oriented to person, place, and time  She appears well-developed and well-nourished  No distress  HENT:   Head: Normocephalic and atraumatic  Neck: Normal range of motion  Neck supple  No JVD present  No thyromegaly present  Cardiovascular: Normal rate, regular rhythm, S1 normal and S2 normal  Exam reveals no gallop and no friction rub  No murmur heard  Pulmonary/Chest: Effort normal  No respiratory distress  She has no wheezes  She has no rales  She exhibits no tenderness  Abdominal: Soft     Musculoskeletal: She exhibits no edema  Neurological: She is alert and oriented to person, place, and time  Skin: Skin is warm and dry  She is not diaphoretic  Psychiatric: She has a normal mood and affect  Vitals reviewed

## 2020-07-21 NOTE — PATIENT INSTRUCTIONS
The patient will try lorazepam 0 5 mg b i d  P r n  Other medications remain the same  I will arrange for follow-up lab

## 2020-07-21 NOTE — PROGRESS NOTES
Assessment/Plan:    Cardiomyopathy ([facility] )    History of dilated cardiomyopathy  This has improved over the years and last echocardiogram showed normal ejection fraction vicinity of 60%  We will continue observation  Paroxysmal atrial fibrillation (HCC)    History of paroxysmal atrial fibrillation  This has been stable the patient is in regular rhythm  This had occurred in the context of hyperthyroidism and has since resolved  Mixed hyperlipidemia    Hyperlipidemia, stable  The patient will continue Livalo at 2 mg daily  Anxiety and depression    Evidence of anxiety and depression seems to be less than adequately controlled  The patient will continue sertraline at 100 mg daily and I will add lorazepam 0 5 mg b i d  P r n  Diagnoses and all orders for this visit:    Cardiomyopathy, unspecified type ([facility] )  -     CBC and differential; Future  -     Comprehensive metabolic panel; Future  -     TSH, 3rd generation  -     Lipid Panel with Direct LDL reflex; Future    Paroxysmal atrial fibrillation (HCC)    Anxiety and depression  -     LORazepam (ATIVAN) 0 5 mg tablet; Take 1 tablet (0 5 mg total) by mouth 2 (two) times a day    Mixed hyperlipidemia    Essential (primary) hypertension  -     carvedilol (COREG) 12 5 mg tablet; Take 1 tablet (12 5 mg total) by mouth 2 (two) times a day          Subjective:   Somewhat anxious     Patient ID: Yenny Neal is a 66 y o  female  The patient presented to this office for the purpose of cardiac follow-up  She has a known history of cardiomyopathy and paroxysmal atrial fibrillation with history of in the past   The patient also had a history of anxiety depression  She has been feeling somewhat anxious listless with some difficulty sleeping  She feels shaking inside  She denies however symptoms of chest pain of breath palpitation, dizziness or lightheadedness  She has no leg edema        The following portions of the patient's history were reviewed and updated as appropriate: allergies, current medications, past family history, past medical history, past social history, past surgical history and problem list     Review of Systems   Constitutional: Positive for fatigue  Respiratory: Negative for apnea, cough, chest tightness, shortness of breath and wheezing  Cardiovascular: Negative for chest pain, palpitations and leg swelling  Gastrointestinal: Negative for abdominal pain  Neurological: Negative for dizziness and light-headedness  Hematological: Negative  Psychiatric/Behavioral: The patient is nervous/anxious  Objective: stable cardiac-wise but appears to be anxious  /72 (BP Location: Left arm, Patient Position: Sitting, Cuff Size: Adult)   Pulse 62   Ht 5' 9" (1 753 m)   Wt 67 6 kg (149 lb)   BMI 22 00 kg/m²          Physical Exam   Constitutional: She is oriented to person, place, and time  She appears well-developed and well-nourished  No distress  HENT:   Head: Normocephalic and atraumatic  Neck: Normal range of motion  Neck supple  No JVD present  No thyromegaly present  Cardiovascular: Normal rate, regular rhythm, S1 normal and S2 normal  Exam reveals no gallop and no friction rub  No murmur heard  Pulmonary/Chest: Effort normal  No respiratory distress  She has no wheezes  She has no rales  She exhibits no tenderness  Abdominal: Soft  Musculoskeletal: She exhibits no edema  Neurological: She is alert and oriented to person, place, and time  Skin: Skin is warm and dry  She is not diaphoretic  Psychiatric: She has a normal mood and affect  Vitals reviewed

## 2020-07-21 NOTE — ASSESSMENT & PLAN NOTE
History of paroxysmal atrial fibrillation  This has been stable the patient is in regular rhythm  This had occurred in the context of hyperthyroidism and has since resolved

## 2020-07-21 NOTE — ASSESSMENT & PLAN NOTE
Evidence of anxiety and depression seems to be less than adequately controlled  The patient will continue sertraline at 100 mg daily and I will add lorazepam 0 5 mg b i d  P r n

## 2020-07-21 NOTE — ASSESSMENT & PLAN NOTE
History of dilated cardiomyopathy  This has improved over the years and last echocardiogram showed normal ejection fraction vicinity of 60%  We will continue observation

## 2020-07-22 DIAGNOSIS — M54.16 LUMBAR RADICULOPATHY: ICD-10-CM

## 2020-07-22 DIAGNOSIS — M48.061 SPINAL STENOSIS OF LUMBAR REGION, UNSPECIFIED WHETHER NEUROGENIC CLAUDICATION PRESENT: ICD-10-CM

## 2020-07-22 RX ORDER — GABAPENTIN 300 MG/1
300 CAPSULE ORAL 3 TIMES DAILY
Qty: 60 CAPSULE | Refills: 0 | Status: SHIPPED | OUTPATIENT
Start: 2020-07-22 | End: 2020-08-19 | Stop reason: SDUPTHER

## 2020-07-22 NOTE — TELEPHONE ENCOUNTER
Please be advise I spoke to pt  Who stated she has only been prescribed Gabapentin by Dr Dana Bynum   Pt  Stated she was only taking 1 tablet at night time because it keep her very drowsy throughout the day  Pt  Stated she  was advised by Dr Ana Friedman to take 2 tablets at night instead of one   Pt  Requesting a script be sent to [place] mail order thanks

## 2020-08-19 ENCOUNTER — TELEPHONE (OUTPATIENT)
Dept: OBGYN CLINIC | Facility: CLINIC | Age: 75
End: 2020-08-19

## 2020-08-19 ENCOUNTER — TELEPHONE (OUTPATIENT)
Dept: OBGYN CLINIC | Facility: HOSPITAL | Age: 75
End: 2020-08-19

## 2020-08-19 DIAGNOSIS — M48.061 SPINAL STENOSIS OF LUMBAR REGION, UNSPECIFIED WHETHER NEUROGENIC CLAUDICATION PRESENT: ICD-10-CM

## 2020-08-19 DIAGNOSIS — M54.16 LUMBAR RADICULOPATHY: ICD-10-CM

## 2020-08-19 RX ORDER — MELOXICAM 15 MG/1
15 TABLET ORAL DAILY
Qty: 90 TABLET | Refills: 0 | Status: SHIPPED | OUTPATIENT
Start: 2020-08-19 | End: 2022-08-09

## 2020-08-19 RX ORDER — GABAPENTIN 300 MG/1
300 CAPSULE ORAL 3 TIMES DAILY
Qty: 270 CAPSULE | Refills: 0 | Status: SHIPPED | OUTPATIENT
Start: 2020-08-19 | End: 2020-11-24 | Stop reason: SDUPTHER

## 2020-08-19 NOTE — TELEPHONE ENCOUNTER
Patient called for a refill for a 90 day supply so patient doesn't have to keep calling each month  It is for Meloxicam 15mg, & Gabapentin 300 Mg  It also needs to go through [employer]  That is where insurance wants it to go through       Best call back number for pt: [phone]

## 2020-08-19 NOTE — TELEPHONE ENCOUNTER
Estiven Virk is calling to state that the insurance will allow 3 additional refills for the year, would you like to allow this, rather than the 90 day supply with no refills?     Meloxicam  [facility]  [phone]

## 2020-08-25 ENCOUNTER — TELEPHONE (OUTPATIENT)
Dept: CARDIOLOGY CLINIC | Facility: CLINIC | Age: 75
End: 2020-08-25

## 2020-08-25 DIAGNOSIS — F41.9 ANXIETY: Primary | ICD-10-CM

## 2020-08-25 DIAGNOSIS — F41.9 ANXIETY: ICD-10-CM

## 2020-08-25 RX ORDER — ALPRAZOLAM 0.25 MG/1
0.25 TABLET ORAL 2 TIMES DAILY PRN
Qty: 60 TABLET | Refills: 5 | Status: SHIPPED | OUTPATIENT
Start: 2020-08-25 | End: 2020-08-25 | Stop reason: SDUPTHER

## 2020-08-25 RX ORDER — ALPRAZOLAM 0.25 MG/1
0.25 TABLET ORAL 2 TIMES DAILY PRN
Qty: 60 TABLET | Refills: 5 | OUTPATIENT
Start: 2020-08-25 | End: 2022-01-19

## 2020-10-09 DIAGNOSIS — I10 ESSENTIAL (PRIMARY) HYPERTENSION: ICD-10-CM

## 2020-10-10 RX ORDER — LEVOTHYROXINE SODIUM 0.1 MG/1
100 TABLET ORAL DAILY
Qty: 90 TABLET | Refills: 3 | Status: SHIPPED | OUTPATIENT
Start: 2020-10-10 | End: 2020-10-22

## 2020-10-22 DIAGNOSIS — I10 ESSENTIAL (PRIMARY) HYPERTENSION: ICD-10-CM

## 2020-10-23 RX ORDER — LEVOTHYROXINE SODIUM 0.1 MG/1
100 TABLET ORAL DAILY
Qty: 90 TABLET | Refills: 3 | Status: SHIPPED | OUTPATIENT
Start: 2020-10-23 | End: 2020-11-10 | Stop reason: SDUPTHER

## 2020-11-10 DIAGNOSIS — I10 ESSENTIAL (PRIMARY) HYPERTENSION: ICD-10-CM

## 2020-11-10 RX ORDER — LEVOTHYROXINE SODIUM 0.1 MG/1
100 TABLET ORAL DAILY
Qty: 90 TABLET | Refills: 3 | Status: SHIPPED | OUTPATIENT
Start: 2020-11-10 | End: 2021-06-22

## 2020-11-24 ENCOUNTER — TELEPHONE (OUTPATIENT)
Dept: OBGYN CLINIC | Facility: HOSPITAL | Age: 75
End: 2020-11-24

## 2020-11-24 DIAGNOSIS — M48.061 SPINAL STENOSIS OF LUMBAR REGION, UNSPECIFIED WHETHER NEUROGENIC CLAUDICATION PRESENT: Primary | ICD-10-CM

## 2020-11-24 DIAGNOSIS — M48.061 SPINAL STENOSIS OF LUMBAR REGION, UNSPECIFIED WHETHER NEUROGENIC CLAUDICATION PRESENT: ICD-10-CM

## 2020-11-24 DIAGNOSIS — M54.16 LUMBAR RADICULOPATHY: ICD-10-CM

## 2020-11-24 RX ORDER — GABAPENTIN 300 MG/1
300 CAPSULE ORAL 3 TIMES DAILY
Qty: 60 CAPSULE | Refills: 1 | Status: SHIPPED | OUTPATIENT
Start: 2020-11-24 | End: 2021-07-06 | Stop reason: SDUPTHER

## 2020-11-24 RX ORDER — GABAPENTIN 300 MG/1
300 CAPSULE ORAL 3 TIMES DAILY
Qty: 270 CAPSULE | Refills: 0 | Status: SHIPPED | OUTPATIENT
Start: 2020-11-24 | End: 2021-07-06

## 2021-01-11 ENCOUNTER — OFFICE VISIT (OUTPATIENT)
Dept: CARDIOLOGY CLINIC | Facility: CLINIC | Age: 76
End: 2021-01-11
Payer: MEDICARE

## 2021-01-11 VITALS
HEART RATE: 60 BPM | BODY MASS INDEX: 21.77 KG/M2 | DIASTOLIC BLOOD PRESSURE: 62 MMHG | SYSTOLIC BLOOD PRESSURE: 136 MMHG | HEIGHT: 69 IN | OXYGEN SATURATION: 100 % | WEIGHT: 147 LBS

## 2021-01-11 DIAGNOSIS — I48.0 PAROXYSMAL ATRIAL FIBRILLATION (HCC): ICD-10-CM

## 2021-01-11 DIAGNOSIS — F32.A ANXIETY AND DEPRESSION: ICD-10-CM

## 2021-01-11 DIAGNOSIS — I42.9 CARDIOMYOPATHY, UNSPECIFIED TYPE (HCC): Primary | ICD-10-CM

## 2021-01-11 DIAGNOSIS — F41.9 ANXIETY AND DEPRESSION: ICD-10-CM

## 2021-01-11 DIAGNOSIS — E78.2 MIXED HYPERLIPIDEMIA: ICD-10-CM

## 2021-01-11 PROCEDURE — 99214 OFFICE O/P EST MOD 30 MIN: CPT | Performed by: INTERNAL MEDICINE

## 2021-01-11 RX ORDER — EZETIMIBE 10 MG/1
10 TABLET ORAL DAILY
Qty: 90 TABLET | Refills: 3 | Status: SHIPPED | OUTPATIENT
Start: 2021-01-11 | End: 2022-01-24 | Stop reason: SDUPTHER

## 2021-01-11 NOTE — ASSESSMENT & PLAN NOTE
Paroxysmal atrial fibrillation in the context of hyperthyroidism  This has resolved and the patient has been in regular for long period time

## 2021-01-11 NOTE — LETTER
January 11, 2021     Jimjoaquim Radha DO  [address] [facility]  [place] [address] [address]    Patient: Joselo Starr   YOB: 1945   Date of Visit: 1/11/2021       Dear Dr Yaw Hi:    Thank you for referring Eduardo Ibarra to me for evaluation  Below are my notes for this consultation  If you have questions, please do not hesitate to call me  I look forward to following your patient along with you  Sincerely,        Stacy Martin MD        CC: No Recipients  Stacy Martin MD  1/11/2021 11:52 AM  Sign when Signing Visit  Assessment/Plan:    Cardiomyopathy [facility])    History of cardiomyopathy  Patient recovered last echocardiogram showing normal left ventricular ejection fraction  We will continue observation  Paroxysmal atrial fibrillation (HCC)    Paroxysmal atrial fibrillation in the context of hyperthyroidism  This has resolved and the patient has been in regular for long period time  Mixed hyperlipidemia    Hyperlipidemia, stable  The patient will continue Zetia 10 mg daily and Livalo at 2 mg daily  Anxiety and depression    History of anxiety and depression, reasonably stable  Diagnoses and all orders for this visit:    Cardiomyopathy, unspecified type ([address] )    Paroxysmal atrial fibrillation (HCC)  -     CBC and differential; Future  -     Comprehensive metabolic panel; Future  -     TSH, 3rd generation  -     Lipid Panel with Direct LDL reflex; Future    Mixed hyperlipidemia  -     ezetimibe (ZETIA) 10 mg tablet; Take 1 tablet (10 mg total) by mouth daily  -     CBC and differential; Future  -     Comprehensive metabolic panel; Future  -     TSH, 3rd generation  -     Lipid Panel with Direct LDL reflex; Future    Anxiety and depression          Subjective:   No cardiac complaints  Patient ID: Joselo Starr is a 66 y o  female  The patient presented to this office for the purpose of cardiac follow-up    She is known to have a history of cardiomyopathy with paroxysmal atrial fibrillation hyperlipidemia  She has also been treated for anxiety and depression  At this point she  This not feel well no specific or identified  The patient denies any symptom chest pain, shortness of breath dizziness or lightheadedness  She has no leg edema  The following portions of the patient's history were reviewed and updated as appropriate: allergies, current medications, past family history, past medical history, past social history, past surgical history and problem list     Review of Systems   Respiratory: Negative for apnea, cough, chest tightness, shortness of breath and wheezing  Cardiovascular: Negative for chest pain, palpitations and leg swelling  Gastrointestinal: Negative for abdominal pain  Neurological: Negative for dizziness and light-headedness  Psychiatric/Behavioral: Negative  Objective:  Stable cardiac-wise  /62 (BP Location: Left arm, Patient Position: Sitting, Cuff Size: Standard)   Pulse 60   Ht 5' 9" (1 753 m)   Wt 66 7 kg (147 lb)   SpO2 100%   BMI 21 71 kg/m²          Physical Exam  Vitals signs reviewed  Constitutional:       General: She is not in acute distress  Appearance: She is well-developed  She is not diaphoretic  HENT:      Head: Normocephalic and atraumatic  Neck:      Musculoskeletal: Normal range of motion and neck supple  Thyroid: No thyromegaly  Vascular: No JVD  Cardiovascular:      Rate and Rhythm: Normal rate and regular rhythm  Heart sounds: S1 normal and S2 normal  No murmur  No friction rub  No gallop  Pulmonary:      Effort: Pulmonary effort is normal  No respiratory distress  Breath sounds: No wheezing or rales  Chest:      Chest wall: No tenderness  Abdominal:      Palpations: Abdomen is soft  Musculoskeletal:         General: No swelling  Skin:     General: Skin is warm and dry  Neurological:      Mental Status: She is oriented to person, place, and time  Psychiatric:         Mood and Affect: Mood normal          Behavior: Behavior normal

## 2021-01-11 NOTE — ASSESSMENT & PLAN NOTE
History of cardiomyopathy  Patient recovered last echocardiogram showing normal left ventricular ejection fraction  We will continue observation

## 2021-01-11 NOTE — PROGRESS NOTES
Assessment/Plan:    Cardiomyopathy ([facility] )    History of cardiomyopathy  Patient recovered last echocardiogram showing normal left ventricular ejection fraction  We will continue observation  Paroxysmal atrial fibrillation (HCC)    Paroxysmal atrial fibrillation in the context of hyperthyroidism  This has resolved and the patient has been in regular for long period time  Mixed hyperlipidemia    Hyperlipidemia, stable  The patient will continue Zetia 10 mg daily and Livalo at 2 mg daily  Anxiety and depression    History of anxiety and depression, reasonably stable  Diagnoses and all orders for this visit:    Cardiomyopathy, unspecified type ([place] [address] )    Paroxysmal atrial fibrillation (HCC)  -     CBC and differential; Future  -     Comprehensive metabolic panel; Future  -     TSH, 3rd generation  -     Lipid Panel with Direct LDL reflex; Future    Mixed hyperlipidemia  -     ezetimibe (ZETIA) 10 mg tablet; Take 1 tablet (10 mg total) by mouth daily  -     CBC and differential; Future  -     Comprehensive metabolic panel; Future  -     TSH, 3rd generation  -     Lipid Panel with Direct LDL reflex; Future    Anxiety and depression          Subjective:   No cardiac complaints  Patient ID: Debbie Munguia is a 66 y o  female  The patient presented to this office for the purpose of cardiac follow-up  She is known to have a history of cardiomyopathy with paroxysmal atrial fibrillation hyperlipidemia  She has also been treated for anxiety and depression  At this point she  This not feel well no specific or identified  The patient denies any symptom chest pain, shortness of breath dizziness or lightheadedness  She has no leg edema        The following portions of the patient's history were reviewed and updated as appropriate: allergies, current medications, past family history, past medical history, past social history, past surgical history and problem list     Review of Systems   Respiratory: Negative for apnea, cough, chest tightness, shortness of breath and wheezing  Cardiovascular: Negative for chest pain, palpitations and leg swelling  Gastrointestinal: Negative for abdominal pain  Neurological: Negative for dizziness and light-headedness  Psychiatric/Behavioral: Negative  Objective:  Stable cardiac-wise  /62 (BP Location: Left arm, Patient Position: Sitting, Cuff Size: Standard)   Pulse 60   Ht 5' 9" (1 753 m)   Wt 66 7 kg (147 lb)   SpO2 100%   BMI 21 71 kg/m²          Physical Exam  Vitals signs reviewed  Constitutional:       General: She is not in acute distress  Appearance: She is well-developed  She is not diaphoretic  HENT:      Head: Normocephalic and atraumatic  Neck:      Musculoskeletal: Normal range of motion and neck supple  Thyroid: No thyromegaly  Vascular: No JVD  Cardiovascular:      Rate and Rhythm: Normal rate and regular rhythm  Heart sounds: S1 normal and S2 normal  No murmur  No friction rub  No gallop  Pulmonary:      Effort: Pulmonary effort is normal  No respiratory distress  Breath sounds: No wheezing or rales  Chest:      Chest wall: No tenderness  Abdominal:      Palpations: Abdomen is soft  Musculoskeletal:         General: No swelling  Skin:     General: Skin is warm and dry  Neurological:      Mental Status: She is oriented to person, place, and time     Psychiatric:         Mood and Affect: Mood normal          Behavior: Behavior normal

## 2021-01-20 ENCOUNTER — IMMUNIZATIONS (OUTPATIENT)
Dept: FAMILY MEDICINE CLINIC | Facility: HOSPITAL | Age: 76
End: 2021-01-20

## 2021-01-20 DIAGNOSIS — Z23 ENCOUNTER FOR IMMUNIZATION: Primary | ICD-10-CM

## 2021-01-20 PROCEDURE — 91301 SARS-COV-2 / COVID-19 MRNA VACCINE (MODERNA) 100 MCG: CPT

## 2021-01-20 PROCEDURE — 0011A SARS-COV-2 / COVID-19 MRNA VACCINE (MODERNA) 100 MCG: CPT

## 2021-01-30 LAB
ALBUMIN SERPL-MCNC: 4.1 G/DL (ref 3.6–5.1)
ALBUMIN/GLOB SERPL: 1.9 (CALC) (ref 1–2.5)
ALP SERPL-CCNC: 60 U/L (ref 37–153)
ALT SERPL-CCNC: 8 U/L (ref 6–29)
AST SERPL-CCNC: 18 U/L (ref 10–35)
BASOPHILS # BLD AUTO: 38 CELLS/UL (ref 0–200)
BASOPHILS NFR BLD AUTO: 0.7 %
BILIRUB SERPL-MCNC: 0.5 MG/DL (ref 0.2–1.2)
BUN SERPL-MCNC: 16 MG/DL (ref 7–25)
BUN/CREAT SERPL: NORMAL (CALC) (ref 6–22)
CALCIUM SERPL-MCNC: 9.2 MG/DL (ref 8.6–10.4)
CHLORIDE SERPL-SCNC: 106 MMOL/L (ref 98–110)
CHOLEST SERPL-MCNC: 157 MG/DL
CHOLEST/HDLC SERPL: 2.9 (CALC)
CO2 SERPL-SCNC: 28 MMOL/L (ref 20–32)
CREAT SERPL-MCNC: 0.88 MG/DL (ref 0.6–0.93)
EOSINOPHIL # BLD AUTO: 329 CELLS/UL (ref 15–500)
EOSINOPHIL NFR BLD AUTO: 6.1 %
ERYTHROCYTE [DISTWIDTH] IN BLOOD BY AUTOMATED COUNT: 12.1 % (ref 11–15)
GLOBULIN SER CALC-MCNC: 2.2 G/DL (CALC) (ref 1.9–3.7)
GLUCOSE SERPL-MCNC: 91 MG/DL (ref 65–99)
HCT VFR BLD AUTO: 38.9 % (ref 35–45)
HDLC SERPL-MCNC: 55 MG/DL
HGB BLD-MCNC: 12.5 G/DL (ref 11.7–15.5)
LDLC SERPL CALC-MCNC: 85 MG/DL (CALC)
LYMPHOCYTES # BLD AUTO: 1139 CELLS/UL (ref 850–3900)
LYMPHOCYTES NFR BLD AUTO: 21.1 %
MCH RBC QN AUTO: 30.1 PG (ref 27–33)
MCHC RBC AUTO-ENTMCNC: 32.1 G/DL (ref 32–36)
MCV RBC AUTO: 93.7 FL (ref 80–100)
MONOCYTES # BLD AUTO: 518 CELLS/UL (ref 200–950)
MONOCYTES NFR BLD AUTO: 9.6 %
NEUTROPHILS # BLD AUTO: 3375 CELLS/UL (ref 1500–7800)
NEUTROPHILS NFR BLD AUTO: 62.5 %
NONHDLC SERPL-MCNC: 102 MG/DL (CALC)
PLATELET # BLD AUTO: 142 THOUSAND/UL (ref 140–400)
PMV BLD REES-ECKER: 11.9 FL (ref 7.5–12.5)
POTASSIUM SERPL-SCNC: 4.3 MMOL/L (ref 3.5–5.3)
PROT SERPL-MCNC: 6.3 G/DL (ref 6.1–8.1)
RBC # BLD AUTO: 4.15 MILLION/UL (ref 3.8–5.1)
SL AMB EGFR AFRICAN AMERICAN: 73 ML/MIN/1.73M2
SL AMB EGFR NON AFRICAN AMERICAN: 63 ML/MIN/1.73M2
SODIUM SERPL-SCNC: 141 MMOL/L (ref 135–146)
TRIGL SERPL-MCNC: 84 MG/DL
TSH SERPL-ACNC: 1.54 MIU/L (ref 0.4–4.5)
WBC # BLD AUTO: 5.4 THOUSAND/UL (ref 3.8–10.8)

## 2021-02-19 ENCOUNTER — IMMUNIZATIONS (OUTPATIENT)
Dept: FAMILY MEDICINE CLINIC | Facility: HOSPITAL | Age: 76
End: 2021-02-19

## 2021-02-19 DIAGNOSIS — Z23 ENCOUNTER FOR IMMUNIZATION: Primary | ICD-10-CM

## 2021-02-19 PROCEDURE — 91301 SARS-COV-2 / COVID-19 MRNA VACCINE (MODERNA) 100 MCG: CPT

## 2021-02-19 PROCEDURE — 0012A SARS-COV-2 / COVID-19 MRNA VACCINE (MODERNA) 100 MCG: CPT

## 2021-02-23 ENCOUNTER — OFFICE VISIT (OUTPATIENT)
Dept: OBGYN CLINIC | Facility: HOSPITAL | Age: 76
End: 2021-02-23
Payer: MEDICARE

## 2021-02-23 ENCOUNTER — HOSPITAL ENCOUNTER (OUTPATIENT)
Dept: RADIOLOGY | Facility: HOSPITAL | Age: 76
Discharge: HOME | End: 2021-02-23
Attending: ORTHOPAEDIC SURGERY
Payer: MEDICARE

## 2021-02-23 VITALS
HEART RATE: 57 BPM | SYSTOLIC BLOOD PRESSURE: 152 MMHG | HEIGHT: 69 IN | BODY MASS INDEX: 21.71 KG/M2 | DIASTOLIC BLOOD PRESSURE: 72 MMHG

## 2021-02-23 DIAGNOSIS — M16.12 PRIMARY OSTEOARTHRITIS OF ONE HIP, LEFT: Primary | ICD-10-CM

## 2021-02-23 DIAGNOSIS — M25.552 LEFT HIP PAIN: ICD-10-CM

## 2021-02-23 DIAGNOSIS — M48.061 SPINAL STENOSIS OF LUMBAR REGION, UNSPECIFIED WHETHER NEUROGENIC CLAUDICATION PRESENT: ICD-10-CM

## 2021-02-23 PROCEDURE — 99214 OFFICE O/P EST MOD 30 MIN: CPT | Performed by: ORTHOPAEDIC SURGERY

## 2021-02-23 PROCEDURE — 73502 X-RAY EXAM HIP UNI 2-3 VIEWS: CPT

## 2021-02-23 NOTE — PROGRESS NOTES
Assessment/Plan:  1  Primary osteoarthritis of one hip, left  FL injection left hip (non arthrogram)    Ambulatory referral to Physical Therapy   2  Left hip pain  XR hip/pelv 2-3 vws left if performed    FL injection left hip (non arthrogram)   3  Spinal stenosis of lumbar region, unspecified whether neurogenic claudication present       Scribe Attestation    I,:  Alis De Jesus am acting as a scribe while in the presence of the attending physician :       I,:  Michelle Callahan MD personally performed the services described in this documentation    as scribed in my presence :         · Symptoms consistent with underlying moderate to severe osteoarthritis  · Patient also has underlying lumbar spine pathology potentially contributing to symptoms  · Recommend diagnostic and hopefully therapeutic intra-articular corticosteroid injection  · Provided referral to interventional radiology for administration of injection  · Feelings of left knee unsteadiness likely related to deconditioning of the quadriceps  Provided referral for formal therapy to optimize knee mechanics  · Follow up 3 months for repeat clinical evaluation  Subjective:  Initial evaluation for left hip pain    Patient ID: Elisa Pratt is a 66 y o  female who presents today for initial evaluation of left hip pain  She states her hip has been painful for at least 2 years  She describes aching pain about the posterior aspect of her hip that can be severe  She does also experience pain in her groin at times  She states this pain can radiate to the medial aspect her knee  Additionally, she complains of numbness and tingling in both legs  She does receive treatment for spinal stenosis  She states that she has difficulty with age-appropriate activities of daily living such as dressing sleeping  She also complains of start-up stiffness  She states that her left knee gives out and she feels unsteady    She does use NSAID medication for pain relief without much benefit  She denies any acute injury or trauma  Review of Systems   Constitutional: Positive for activity change  Negative for chills, fever and unexpected weight change  HENT: Negative for hearing loss, nosebleeds and sore throat  Eyes: Negative for pain, redness and visual disturbance  Respiratory: Negative for cough, shortness of breath and wheezing  Cardiovascular: Negative for chest pain, palpitations and leg swelling  Gastrointestinal: Negative for abdominal pain, nausea and vomiting  Endocrine: Negative for polydipsia and polyuria  Genitourinary: Negative for dysuria and hematuria  Musculoskeletal: Positive for arthralgias and myalgias  Negative for joint swelling  See HPI   Skin: Negative for rash and wound  Neurological: Negative for dizziness, numbness and headaches  Psychiatric/Behavioral: Negative for decreased concentration and suicidal ideas  The patient is not nervous/anxious  Past Medical History:   Diagnosis Date    Anxiety     [facility] fibrillation [facility] )     Cardiac dysrhythmia     Cardiomyopathy ([facility] )     Cardiomyopathy ([facility] )     Depression     Hyperlipidemia     Hyperthyroidism     Hypothyroid     Palpitation     Post-nasal drip        History reviewed  No pertinent surgical history      Family History   Problem Relation Age of Onset    Arthritis Mother     Heart failure Father     Cancer Father        Social History     Occupational History    Not on file   Tobacco Use    Smoking status: Former Smoker    Smokeless tobacco: Never Used   Substance and Sexual Activity    Alcohol use: Yes     Comment: occasional     Drug use: No    Sexual activity: Not on file         Current Outpatient Medications:     ALPRAZolam (XANAX) 0 25 mg tablet, Take 1 tablet (0 25 mg total) by mouth 2 (two) times a day as needed for anxiety, Disp: 60 tablet, Rfl: 5    carvedilol (COREG) 12 5 mg tablet, Take 1 tablet (12 5 mg total) by mouth 2 (two) times a day, Disp: 180 tablet, Rfl: 3    cholecalciferol (VITAMIN D3) 1,000 units tablet, Take by mouth, Disp: , Rfl:     ezetimibe (ZETIA) 10 mg tablet, Take 1 tablet (10 mg total) by mouth daily, Disp: 90 tablet, Rfl: 3    gabapentin (NEURONTIN) 300 mg capsule, Take 1 capsule (300 mg total) by mouth 3 (three) times a day (Patient taking differently: Take 300 mg by mouth daily ), Disp: 270 capsule, Rfl: 0    gabapentin (NEURONTIN) 300 mg capsule, Take 1 capsule (300 mg total) by mouth 3 (three) times a day (Patient taking differently: Take 300 mg by mouth daily ), Disp: 60 capsule, Rfl: 1    levothyroxine 100 mcg tablet, Take 1 tablet (100 mcg total) by mouth daily, Disp: 90 tablet, Rfl: 3    meloxicam (MOBIC) 15 mg tablet, Take 1 tablet (15 mg total) by mouth daily (Patient not taking: Reported on 1/11/2021), Disp: 90 tablet, Rfl: 0    pitavastatin (Livalo) 2 mg, Take 1 tablet (2 mg total) by mouth daily, Disp: 90 tablet, Rfl: 3    sertraline (ZOLOFT) 100 mg tablet, Take 1 tablet (100 mg total) by mouth daily, Disp: 90 tablet, Rfl: 3    Allergies   Allergen Reactions    Methimazole        Objective:  Vitals:    02/23/21 1526   BP: 152/72   Pulse: 57       Body mass index is 21 71 kg/m²  Left Hip Exam     Tenderness   Left hip tenderness location: piriformis fossa  Range of Motion   Abduction: normal   Adduction: normal   Flexion: normal   External rotation: normal   Internal rotation: normal     Muscle Strength   Abduction: 5/5   Adduction: 5/5   Flexion: 5/5     Tests   RADHA: negative    Other   Erythema: absent  Scars: absent  Sensation: normal  Pulse: present    Comments:  [place]: positive  FADIR: positive            Physical Exam  Vitals signs and nursing note reviewed  Constitutional:       Appearance: She is well-developed  HENT:      Head: Normocephalic and atraumatic  Eyes:      General: No scleral icterus       Conjunctiva/sclera: Conjunctivae normal    Neck: Musculoskeletal: Normal range of motion and neck supple  Cardiovascular:      Rate and Rhythm: Normal rate  Pulmonary:      Effort: Pulmonary effort is normal  No respiratory distress  Musculoskeletal:      Comments: As noted in HPI   Skin:     General: Skin is warm and dry  Neurological:      Mental Status: She is alert and oriented to person, place, and time  Psychiatric:         Behavior: Behavior normal          I have personally reviewed pertinent films in PACS  X-ray of the left hip obtained on 02/23/2021 reviewed demonstrating moderate to severe degenerative changes evidence by joint space narrowing most prominent inferomedially, sclerosis, and marginal osteophytosis  There is no acute fracture, dislocation, lytic or blastic lesion

## 2021-02-25 NOTE — NURSING NOTE
Call placed to patient to discuss [facility] and complete consultation with patient  Patient is having Left hip injection with fluoroscopy   Reviewed with patient her allergies , no current anticoagulant medication present per patient , also discussed the pre and post procedure expectations  Reminded patient of location and time expected for procedure, Patient expressed understanding by verbalizing and repeating instructions

## 2021-02-26 ENCOUNTER — TRANSCRIBE ORDERS (OUTPATIENT)
Dept: RADIOLOGY | Facility: HOSPITAL | Age: 76
End: 2021-02-26

## 2021-02-26 ENCOUNTER — HOSPITAL ENCOUNTER (OUTPATIENT)
Dept: RADIOLOGY | Facility: HOSPITAL | Age: 76
Discharge: HOME | End: 2021-02-26
Attending: ORTHOPAEDIC SURGERY | Admitting: RADIOLOGY
Payer: MEDICARE

## 2021-02-26 DIAGNOSIS — M16.12 PRIMARY OSTEOARTHRITIS OF ONE HIP, LEFT: ICD-10-CM

## 2021-02-26 DIAGNOSIS — M25.552 LEFT HIP PAIN: ICD-10-CM

## 2021-02-26 PROCEDURE — 20610 DRAIN/INJ JOINT/BURSA W/O US: CPT

## 2021-02-26 PROCEDURE — 77002 NEEDLE LOCALIZATION BY XRAY: CPT

## 2021-02-26 RX ORDER — METHYLPREDNISOLONE ACETATE 80 MG/ML
80 INJECTION, SUSPENSION INTRA-ARTICULAR; INTRALESIONAL; INTRAMUSCULAR; SOFT TISSUE
Status: COMPLETED | OUTPATIENT
Start: 2021-02-26 | End: 2021-02-26

## 2021-02-26 RX ORDER — BUPIVACAINE HYDROCHLORIDE 2.5 MG/ML
30 INJECTION, SOLUTION EPIDURAL; INFILTRATION; INTRACAUDAL
Status: COMPLETED | OUTPATIENT
Start: 2021-02-26 | End: 2021-02-26

## 2021-02-26 RX ORDER — LIDOCAINE HYDROCHLORIDE 10 MG/ML
10 INJECTION, SOLUTION EPIDURAL; INFILTRATION; INTRACAUDAL; PERINEURAL
Status: COMPLETED | OUTPATIENT
Start: 2021-02-26 | End: 2021-02-26

## 2021-02-26 RX ADMIN — METHYLPREDNISOLONE ACETATE 80 MG: 80 INJECTION, SUSPENSION INTRA-ARTICULAR; INTRALESIONAL; INTRAMUSCULAR; SOFT TISSUE at 12:33

## 2021-02-26 RX ADMIN — LIDOCAINE HYDROCHLORIDE 2 ML: 10 INJECTION, SOLUTION EPIDURAL; INFILTRATION; INTRACAUDAL; PERINEURAL at 12:33

## 2021-02-26 RX ADMIN — BUPIVACAINE HYDROCHLORIDE 2 ML: 2.5 INJECTION, SOLUTION EPIDURAL; INFILTRATION; INTRACAUDAL at 12:33

## 2021-02-26 RX ADMIN — IOHEXOL 3 ML: 300 INJECTION, SOLUTION INTRAVENOUS at 12:33

## 2021-03-01 ENCOUNTER — EVALUATION (OUTPATIENT)
Dept: PHYSICAL THERAPY | Facility: REHABILITATION | Age: 76
End: 2021-03-01
Payer: MEDICARE

## 2021-03-01 DIAGNOSIS — M16.12 PRIMARY OSTEOARTHRITIS OF ONE HIP, LEFT: ICD-10-CM

## 2021-03-01 PROCEDURE — 97162 PT EVAL MOD COMPLEX 30 MIN: CPT | Performed by: PHYSICAL THERAPIST

## 2021-03-01 PROCEDURE — 97110 THERAPEUTIC EXERCISES: CPT | Performed by: PHYSICAL THERAPIST

## 2021-03-01 NOTE — PROGRESS NOTES
PT Evaluation     Today's date: 3/1/2021  Patient name: Wesley Urbano  : 1945  MRN: [id number]  Referring provider: Osmani Larsen MD  Dx:   Encounter Diagnosis     ICD-10-CM    1  Primary osteoarthritis of one hip, left  M16 12 Ambulatory referral to Physical Therapy       Start Time: 955  Stop Time: 1050  Total time in clinic (min): 55 minutes    Assessment  Assessment details: Patient is a 66 y o  female that presents with left hip pain  Patient presents with decreased strength and decreased ROM  Patient has difficulty with ambulation, sitting, and any activity in one position for an extended period of time secondary to impairments  Patient would benefit from skilled physical therapy services to address impairments to maximize function  Impairments: abnormal or restricted ROM, activity intolerance, impaired physical strength, lacks appropriate home exercise program and pain with function  Understanding of Dx/Px/POC: good   Prognosis: fair  Prognosis details: chronic    Goals  Impairment:  1  Patient will reports 50% reduction in pain in 4 weeks to maximize function  2   Patient will improve strength to 4/5 in all planes to maximize function  3   Patient will improve ROM to [facility] in 4 weeks to maximize function  Functional:  1  Patient will improve FOTO by 10 points to 60/100 in 4 weeks to maximize function  2  Patient will be independent with HEP in 4 weeks to maximize function  3  Patient will report no difficulty with house/yard work in 4 weeks to maximize function  4  Patient will report no difficulty with prolonged sitting in 4 weeks to maximize function  5  Patient will report no difficulty with ambulation in 4 weeks to maximize function  Plan  Plan details: Patient will be a RE in 4 weeks      Patient would benefit from: skilled physical therapy  Planned modality interventions: cryotherapy and thermotherapy: hydrocollator packs  Planned therapy interventions: activity modification, abdominal trunk stabilization, manual therapy, patient education, postural training, stretching, strengthening, therapeutic activities, therapeutic exercise, home exercise program, functional ROM exercises, neuromuscular re-education and balance  Frequency: 2x week  Duration in visits: 9  Duration in weeks: 4  Treatment plan discussed with: patient        Subjective Evaluation    History of Present Illness  Mechanism of injury: Patient presents with left hip pain for the last several years  Patient had an injection in her left hip which has been helpful  Patient reports a history of spinal stenosis over the last several years with pain down her left leg to knee  Patient reports feeling a stinging in her knee  Patient reports her pain tends to be worst when in one position for too long  For example sitting can be helpful, but then feels she needs to stand  Patient reports difficulty with ambulation, sitting, and anything in one position for an extended period of time  Patient is responsible to a lot around her property including care for chickens, geese, peacocks, etc   Patient describes her pain as in the left groin or posterior buttock  Patient reports no change in bowel or bladder  Pain  At best pain ratin  At worst pain ratin (3/10 post injection)  Location: L hip  Quality: dull ache (stinging)  Relieving factors: heat  Aggravating factors: sitting and standing      Diagnostic Tests  X-ray: abnormal (OA)  Treatments  Previous treatment: injection treatment  Current treatment: physical therapy  Patient Goals  Patient goals for therapy: increased strength and decreased pain          Objective     Concurrent Complaints  Negative for bladder dysfunction, bowel dysfunction and saddle (S4) numbness    Active Range of Motion     Lumbar   Normal active range of motion    Passive Range of Motion   Left Hip   Flexion: [facility]  External rotation (90/90):  [facility]  Internal rotation (90/90): WFL    Right Hip   Flexion: [facility]  External rotation (90/90): [facility]  Internal rotation (90/90): WFL  Left Knee   Flexion: WFL  Extension: Left knee passive extension: slight knee flexion contracture  Right Knee   Normal passive range of motion    Additional Passive Range of Motion Details  Hamstring length: WFL chaim    Strength/Myotome Testing     Left Hip   Planes of Motion   Flexion: 4+  Extension: 4  Abduction: 4+  External rotation: 4+  Internal rotation: 4+    Right Hip   Planes of Motion   Flexion: 4+  Extension: 4  Abduction: 4+  External rotation: 4+  Internal rotation: 4+    Left Knee   Flexion: 4+  Extension: 4    Right Knee   Flexion: 4+  Extension: 4-    Left Ankle/Foot   Dorsiflexion: 5    Right Ankle/Foot   Dorsiflexion: 5    Tests     Lumbar     Left   Negative crossed SLR, passive SLR and quadrant  Right   Negative crossed SLR, passive SLR and quadrant  Left Hip   Positive FADIR  Negative RADHA  Right Hip   Positive FADIR  Negative RADHA       Functional Assessment        Single Leg Stance   Left: 30 seconds  Right: 30 seconds    Comments  Tandem stance: 30 seconds chaim             Precautions: hx anxiety, depression, HTN      Manuals 3/1            Light piriformis stretch                                                    Neuro Re-Ed             SLS issued 3x30"            Abdominal bracing issued            Mini squat 15x            Sidestepping foam                                                    Ther Ex             Treadmill warm up/bike             SLR 5" 10x issued L                                                                                          Ther Activity                                       Gait Training                                       Modalities

## 2021-03-03 ENCOUNTER — OFFICE VISIT (OUTPATIENT)
Dept: PHYSICAL THERAPY | Facility: REHABILITATION | Age: 76
End: 2021-03-03
Payer: MEDICARE

## 2021-03-03 DIAGNOSIS — M16.12 PRIMARY OSTEOARTHRITIS OF ONE HIP, LEFT: Primary | ICD-10-CM

## 2021-03-03 PROCEDURE — 97112 NEUROMUSCULAR REEDUCATION: CPT

## 2021-03-03 PROCEDURE — 97110 THERAPEUTIC EXERCISES: CPT

## 2021-03-03 NOTE — PROGRESS NOTES
Daily Note     Today's date: 3/3/2021  Patient name: Kristin Grace  : 1945  MRN: [id number]  Referring provider: Carolyn Burnett MD  Dx:   Encounter Diagnosis     ICD-10-CM    1  Primary osteoarthritis of one hip, left  M16 12        Start Time: 945  Stop Time: 1025  Total time in clinic (min): 40 minutes    Subjective: Pt denies soreness after IE and reports discomfort in L leg, buttocks area  Pt reports compliance with HEP  Objective: See treatment diary below    Precautions: hx anxiety, depression, HTN      Manuals 3/1 3/3           Light piriformis stretch  30"x3 ea                                                  Neuro Re-Ed             SLS issued 3x30" 30"x3 ea           Abdominal bracing issued 10" x10           Mini squat 15x x15           Sidestepping foam  x3 ea                                                  Ther Ex             Treadmill warm up/bike  5 min bike           SLR 5" 10x issued L 5"x10 L                                                                                         Ther Activity                                       Gait Training                                       Modalities                                            Assessment: Pt presents to PT for first visit after evaluation, initiated TE as noted per PT POC  Tolerated treatment well  Pt is challenged by SLS when looking into mirror, however demonstrates better balance otherwise requiring occasional HHA to maintain vs almost constant fingertip assist   L foot drag demonstrated at times with side stepping needing cues to correct  Piriformis stretch performed manually felt in groin, trialed in chair with more appropriate stretch noted  Patient demonstrated fatigue post treatment and would benefit from continued PT  Plan: Progress treatment as tolerated

## 2021-03-08 ENCOUNTER — OFFICE VISIT (OUTPATIENT)
Dept: PHYSICAL THERAPY | Facility: REHABILITATION | Age: 76
End: 2021-03-08
Payer: MEDICARE

## 2021-03-08 DIAGNOSIS — M16.12 PRIMARY OSTEOARTHRITIS OF ONE HIP, LEFT: Primary | ICD-10-CM

## 2021-03-08 PROCEDURE — 97112 NEUROMUSCULAR REEDUCATION: CPT | Performed by: PHYSICAL THERAPIST

## 2021-03-08 PROCEDURE — 97110 THERAPEUTIC EXERCISES: CPT | Performed by: PHYSICAL THERAPIST

## 2021-03-08 NOTE — PROGRESS NOTES
Daily Note     Today's date: 3/8/2021  Patient name: Blake Yusuf  : 1945  MRN: [id number]  Referring provider: Carmen Carr MD  Dx:   Encounter Diagnosis     ICD-10-CM    1  Primary osteoarthritis of one hip, left  M16 12        Start Time: 0950  Stop Time: 1035  Total time in clinic (min): 45 minutes    Subjective: Patient reports that she is doing well overall  She has not had much buttock or left groin pain, but her medial left knee pain has been bothering her  Objective: See treatment diary below    Precautions: hx anxiety, depression, HTN      Manuals 3/1 3/3 3/8          Light piriformis stretch  30"x3 ea 3x30" L                                                 Neuro Re-Ed             SLS issued 3x30" 30"x3 ea 3x30" chaim          Abdominal bracing issued 10" x10 10" 10x          Mini squat 15x x15 20x          Sidestepping foam  x3 ea 4 laps          Walking forward beam   4 laps                                    Ther Ex             Recumbent bike  5 min bike 6 min          SLR 5" 10x issued L 5"x10 L 5" 12x L          LLLD knee extension stretch   4 min                                                                           Ther Activity                                       Gait Training                                       Modalities                                            Assessment: Tolerated treatment well  Patient is progressed with addition of LLLD stretch into knee extension as she lacks several degrees on the left  Right WNL  Patient has no lag with SLR, but fatigues  Patient demonstrated fatigue post treatment, exhibited good technique with therapeutic exercises and would benefit from continued PT  Progress as able  Plan: Progress treatment as tolerated

## 2021-03-10 ENCOUNTER — OFFICE VISIT (OUTPATIENT)
Dept: PHYSICAL THERAPY | Facility: REHABILITATION | Age: 76
End: 2021-03-10
Payer: MEDICARE

## 2021-03-10 DIAGNOSIS — M16.12 PRIMARY OSTEOARTHRITIS OF ONE HIP, LEFT: Primary | ICD-10-CM

## 2021-03-10 PROCEDURE — 97110 THERAPEUTIC EXERCISES: CPT | Performed by: PHYSICAL THERAPIST

## 2021-03-10 PROCEDURE — 97112 NEUROMUSCULAR REEDUCATION: CPT | Performed by: PHYSICAL THERAPIST

## 2021-03-10 NOTE — PROGRESS NOTES
Daily Note     Today's date: 3/10/2021  Patient name: Kerrie Yadav  : 1945  MRN: [id number]  Referring provider: Rhett Lanza MD  Dx:   Encounter Diagnosis     ICD-10-CM    1  Primary osteoarthritis of one hip, left  M16 12        Start Time: 1033  Stop Time: 1116  Total time in clinic (min): 43 minutes    Subjective: Patient reports that she is doing well overall  She continues to note improvement overall with her left buttock pain  She had some left knee soreness/pain following last session  Patient describes her pain as a stinging on the inside of her knee  Objective: See treatment diary below    Precautions: hx anxiety, depression, HTN      Manuals 3/1 3/3 3/8 3/10         Light piriformis stretch  30"x3 ea 3x30" L 3x30" L         Long axis knee distraction with posterior mobs    5 min         L hip PROM    5 min                      Neuro Re-Ed             SLS issued 3x30" 30"x3 ea 3x30" chaim 3x30" chaim         Abdominal bracing issued 10" x10 10" 10x 10" 10x         Mini squat 15x x15 20x 15x         Sidestepping foam  x3 ea 4 laps 4 laps         Walking forward beam   4 laps 4 laps         Sidestepping band    2 laps yellow                      Ther Ex             Recumbent bike  5 min bike 6 min 7 min         SLR 5" 10x issued L 5"x10 L 5" 12x L 5" 12x L         LLLD knee extension stretch   4 min 3 min                                                                          Ther Activity                                       Gait Training                                       Modalities                                            Assessment: Tolerated treatment well  Patient presents with with medial knee pain with LLLD stretch  Trial of distraction, IR and ER tibial rotation with extension with minimal relief  Patient has no lag with SLR, but fatigues    Patient demonstrated fatigue post treatment, exhibited good technique with therapeutic exercises and would benefit from continued PT  Progress as able  Educated she may have soreness from added sidestepping  Plan: Progress treatment as tolerated

## 2021-03-15 ENCOUNTER — OFFICE VISIT (OUTPATIENT)
Dept: PHYSICAL THERAPY | Facility: REHABILITATION | Age: 76
End: 2021-03-15
Payer: MEDICARE

## 2021-03-15 DIAGNOSIS — M16.12 PRIMARY OSTEOARTHRITIS OF ONE HIP, LEFT: Primary | ICD-10-CM

## 2021-03-15 PROCEDURE — 97110 THERAPEUTIC EXERCISES: CPT

## 2021-03-15 PROCEDURE — 97112 NEUROMUSCULAR REEDUCATION: CPT

## 2021-03-15 NOTE — PROGRESS NOTES
Daily Note     Today's date: 3/15/2021  Patient name: Dhaval Cortes  : 1945  MRN: [id number]  Referring provider: Leatha Sultana MD  Dx:   Encounter Diagnosis     ICD-10-CM    1  Primary osteoarthritis of one hip, left  M16 12        Start Time: 945  Stop Time: 1030  Total time in clinic (min): 45 minutes    Subjective: Patient reports no increase in soreness after last session with addition of side stepping  Pt notes "my hip is doing okay," but "my knee still hurts" and describes knee pain as primary complaint arriving to PT this visit  Objective: See treatment diary below    Precautions: hx anxiety, depression, HTN      Manuals 3/1 3/3 3/8 3/10 3/15        Light piriformis stretch  30"x3 ea 3x30" L 3x30" L 30"x3 L        Long axis knee distraction with posterior mobs    5 min np        L hip PROM    5 min 5 min                     Neuro Re-Ed             SLS issued 3x30" 30"x3 ea 3x30" chaim 3x30" chaim 30"x3 ea        Abdominal bracing issued 10" x10 10" 10x 10" 10x 10" x10        Mini squat 15x x15 20x 15x x15        Sidestepping foam  x3 ea 4 laps 4 laps x4 ea        Walking forward beam   4 laps 4 laps x4 ea        Sidestepping band    2 laps yellow Yellow x3 ea                     Ther Ex             Recumbent bike  5 min bike 6 min 7 min 10 min        SLR 5" 10x issued L 5"x10 L 5" 12x L 5" 12x L 5"x12 L        LLLD knee extension stretch   4 min 3 min 3 min                                                                         Ther Activity                                       Gait Training                                       Modalities                                            Assessment: Tolerated treatment well  Pt is most limited with hip IR PROM with soft tissue end feel and no pain  Medial knee discomfort continues to be present with prolonged extension    LE fatigue noted throughout session limiting further progression of exercises, but was able to tolerate additional lap of side stepping with mild-mod difficulty  Mobs np  Patient demonstrated fatigue post treatment and would benefit from continued PT  Plan: Progress treatment as tolerated

## 2021-03-17 ENCOUNTER — OFFICE VISIT (OUTPATIENT)
Dept: PHYSICAL THERAPY | Facility: REHABILITATION | Age: 76
End: 2021-03-17
Payer: MEDICARE

## 2021-03-17 DIAGNOSIS — M16.12 PRIMARY OSTEOARTHRITIS OF ONE HIP, LEFT: Primary | ICD-10-CM

## 2021-03-17 PROCEDURE — 97110 THERAPEUTIC EXERCISES: CPT

## 2021-03-17 PROCEDURE — 97112 NEUROMUSCULAR REEDUCATION: CPT

## 2021-03-17 NOTE — PROGRESS NOTES
Daily Note     Today's date: 3/17/2021  Patient name: Faiza Barnes  : 1945  MRN: [id number]  Referring provider: Marlene Mcgill MD  Dx:   Encounter Diagnosis     ICD-10-CM    1  Primary osteoarthritis of one hip, left  M16 12        Start Time: 1030  Stop Time: 1120  Total time in clinic (min): 50 minutes    Subjective: Patient denies soreness after last treatment  Pt reports L hip continues to feel better since injections with no c/o pain arriving to PT this visit  Pt notes however knee pain is more limiting than hip  Objective: See treatment diary below    Precautions: hx anxiety, depression, HTN      Manuals 3/1 3/3 3/8 3/10 3/15 3/17       Light piriformis stretch  30"x3 ea 3x30" L 3x30" L 30"x3 L 30"x3 L       Long axis knee distraction with posterior mobs    5 min np np       L hip PROM    5 min 5 min 5 min                    Neuro Re-Ed             SLS issued 3x30" 30"x3 ea 3x30" chaim 3x30" chaim 30"x3 ea 30"x3 ea       Abdominal bracing issued 10" x10 10" 10x 10" 10x 10" x10 10" x10       Mini squat 15x x15 20x 15x x15 x20       Sidestepping foam  x3 ea 4 laps 4 laps x4 ea x4 ea       Walking forward beam   4 laps 4 laps x4 ea x4 ea       Sidestepping band    2 laps yellow Yellow x3 ea Yellow x3 ea                    Ther Ex             Recumbent bike  5 min bike 6 min 7 min 10 min 10 min       SLR 5" 10x issued L 5"x10 L 5" 12x L 5" 12x L 5"x12 L 5"x15L       LLLD knee extension stretch   4 min 3 min 3 min 4 min                                                                        Ther Activity                                       Gait Training                                       Modalities                                            Assessment: Tolerated treatment well  Pt presents with hip flex and ER wfl's, however is limited into hip IR end range with soft tissue end feel  Good tolerance to LLLD in extension this visit with mild discomfort   Gluteal fatigue demonstrated with side stepping and instability at times with balance on soft surface  Mobs np this visit  Assess response to treatment NV and progress as able  Patient demonstrated fatigue post treatment and would benefit from continued PT  Plan: Progress treatment as tolerated  Pt was treated via unsupervised time from 11:08 - 11:20 am and was 1:1 with treating clinician for rest of session

## 2021-03-18 DIAGNOSIS — E78.2 MIXED HYPERLIPIDEMIA: ICD-10-CM

## 2021-03-22 ENCOUNTER — OFFICE VISIT (OUTPATIENT)
Dept: PHYSICAL THERAPY | Facility: REHABILITATION | Age: 76
End: 2021-03-22
Payer: MEDICARE

## 2021-03-22 DIAGNOSIS — M16.12 PRIMARY OSTEOARTHRITIS OF ONE HIP, LEFT: Primary | ICD-10-CM

## 2021-03-22 PROCEDURE — 97110 THERAPEUTIC EXERCISES: CPT

## 2021-03-22 PROCEDURE — 97112 NEUROMUSCULAR REEDUCATION: CPT

## 2021-03-22 NOTE — PROGRESS NOTES
Daily Note     Today's date: 3/22/2021  Patient name: Elisa Pratt  : 1945  MRN: [id number]  Referring provider: Angelica Kilgore MD  Dx:   Encounter Diagnosis     ICD-10-CM    1  Primary osteoarthritis of one hip, left  M16 12        Start Time: 1045  Stop Time: 1135  Total time in clinic (min): 50 minutes    Subjective: Patient reports L hip is doing well and has no c/o pain, but reports limitations are primarily coming from L knee  Pt reports feeling as though she is getting close to discharge and transitioning to HEP  Pt reports feeling about 70% improved since beginning physical therapy  Objective: See treatment diary below    Precautions: hx anxiety, depression, HTN      Manuals 3/1 3/3 3/8 3/10 3/15 3/17 3/22      Light piriformis stretch  30"x3 ea 3x30" L 3x30" L 30"x3 L 30"x3 L 30"x3 L      Long axis knee distraction with posterior mobs    5 min np np       L hip PROM    5 min 5 min 5 min 5 min                   Neuro Re-Ed             SLS issued 3x30" 30"x3 ea 3x30" chaim 3x30" chaim 30"x3 ea 30"x3 ea 30"x3 ea      Abdominal bracing issued 10" x10 10" 10x 10" 10x 10" x10 10" x10 10" x10      Mini squat 15x x15 20x 15x x15 x20 x20      Sidestepping foam  x3 ea 4 laps 4 laps x4 ea x4 ea x4 ea      Walking forward beam   4 laps 4 laps x4 ea x4 ea x4 ea      Sidestepping band    2 laps yellow Yellow x3 ea Yellow x3 ea yellow x3 ea                   Ther Ex             Recumbent bike  5 min bike 6 min 7 min 10 min 10 min 10 min      SLR 5" 10x issued L 5"x10 L 5" 12x L 5" 12x L 5"x12 L 5"x15L 5"x15 L      LLLD knee extension stretch   4 min 3 min 3 min 4 min 3 min                                                                       Ther Activity                                       Gait Training                                       Modalities                                            Assessment: Tolerated treatment well   Mild hip IR limitations passively with soft tissue end feel, but is approaching wfl's  Medial knee pain present with LLLD extension that diminishes upon completion  Good balance demonstrated in SLS and side stepping on foam, however continues to be challenged with narrow stance fwd walking on beam  Patient demonstrated fatigue post treatment and would benefit from continued PT  Plan: Progress treatment as tolerated  Plan to RE next visit with probable DC at that time to transition to HEP  Pt was treated via unsupervised time from 10:45 - 10:55 am and was 1:1 with treating clinician for rest of session

## 2021-03-24 ENCOUNTER — EVALUATION (OUTPATIENT)
Dept: PHYSICAL THERAPY | Facility: REHABILITATION | Age: 76
End: 2021-03-24
Payer: MEDICARE

## 2021-03-24 DIAGNOSIS — M16.12 PRIMARY OSTEOARTHRITIS OF ONE HIP, LEFT: Primary | ICD-10-CM

## 2021-03-24 PROCEDURE — 97140 MANUAL THERAPY 1/> REGIONS: CPT | Performed by: PHYSICAL THERAPIST

## 2021-03-24 PROCEDURE — 97110 THERAPEUTIC EXERCISES: CPT | Performed by: PHYSICAL THERAPIST

## 2021-03-24 NOTE — PROGRESS NOTES
PT Re-Evaluation     Today's date: 3/24/2021  Patient name: Joseluis Armando  : 1945  MRN: [id number]  Referring provider: Antwan Nascimento MD  Dx:   Encounter Diagnosis     ICD-10-CM    1  Primary osteoarthritis of one hip, left  M16 12        Start Time: 0950  Stop Time: 1030  Total time in clinic (min): 40 minutes    Assessment  Assessment details: Patient is a 66 y o  female that presents with left hip pain  Patient reports 80% improvement with skilled physical therapy services  Patient's FOTO improved by 5 points to 55/100  Patient reports improvement with overall pain, ambulation, sitting tolerance and house/yard work  Patient reports continued difficulty with ambulation, sitting tolerance, and house/yard work  Patient has made good progress towards goals established for physical therapy  Patient would benefit from continued skilled physical therapy services for continued LE strengthening to maximize function  Impairments: abnormal or restricted ROM, activity intolerance and impaired physical strength  Understanding of Dx/Px/POC: good   Prognosis: fair  Prognosis details: chronic    Goals  Impairment:  1  Patient will reports 50% reduction in pain in 4 weeks to maximize function  -PARTIALLY MET  2  Patient will improve strength to 4/5 in all planes to maximize function  -PARTIALLY MET  3  Patient will improve ROM to [facility] in 4 weeks to maximize function  -MET    Functional:  1  Patient will improve FOTO by 10 points to 60/100 in 4 weeks to maximize function  -PARTIALLY MET  2  Patient will be independent with HEP in 4 weeks to maximize function  -MET  3  Patient will report no difficulty with house/yard work in 4 weeks to maximize function  -PARTIALLY MET  4  Patient will report no difficulty with prolonged sitting in 4 weeks to maximize function  -PARTIALLY MET  5  Patient will report no difficulty with ambulation in 4 weeks to maximize function  -PARTIALLY MET        Plan  Plan details: Patient will be reduced to one visit/week at this time  Patient would benefit from: skilled physical therapy  Planned modality interventions: cryotherapy and thermotherapy: hydrocollator packs  Planned therapy interventions: activity modification, abdominal trunk stabilization, manual therapy, patient education, postural training, stretching, strengthening, therapeutic activities, therapeutic exercise, home exercise program, functional ROM exercises, neuromuscular re-education and balance  Frequency: 1x week  Duration in visits: 4  Duration in weeks: 4  Treatment plan discussed with: patient        Subjective Evaluation    History of Present Illness  Mechanism of injury: Patient presents with left hip pain for the last several years  Patient had an injection in her left hip which has been helpful  Patient reports a history of spinal stenosis over the last several years with pain down her left leg to knee  Patient reports feeling a stinging in her knee  Patient reports her pain tends to be worst when in one position for too long  For example sitting can be helpful, but then feels she needs to stand  Patient reports difficulty with ambulation, sitting, and anything in one position for an extended period of time  Patient is responsible to a lot around her property including care for chickens, geese, peacocks, etc   Patient describes her pain as in the left groin or posterior buttock  Patient reports no change in bowel or bladder    Pain  At best pain ratin (L knee: 0/10)  At worst pain ratin (L knee:7/10)  Location: L hip  Quality: dull ache (knee: stinging)  Relieving factors: heat  Aggravating factors: sitting and standing  Progression: improved      Diagnostic Tests  X-ray: abnormal (OA)  Treatments  Previous treatment: injection treatment  Current treatment: physical therapy  Patient Goals  Patient goals for therapy: increased strength and decreased pain          Objective     Concurrent Complaints  Negative for bladder dysfunction, bowel dysfunction and saddle (S4) numbness    Active Range of Motion     Lumbar   Normal active range of motion    Passive Range of Motion   Left Hip   Flexion: [facility] [place]  External rotation (90/90): [facility] [place]  Internal rotation (90/90): WFL    Right Hip   Flexion: [facility] [place]  External rotation (90/90): [facility] [place]  Internal rotation (90/90): WFL  Left Knee   Flexion: WFL  Extension: Left knee passive extension: slight knee flexion contracture  Right Knee   Normal passive range of motion    Additional Passive Range of Motion Details  Hamstring length: WFL chaim    Strength/Myotome Testing     Left Hip   Planes of Motion   Flexion: 4+  Extension: 4  Abduction: 4+  External rotation: 4+  Internal rotation: 4+    Right Hip   Planes of Motion   Flexion: 4+  Extension: 4  Abduction: 4+  External rotation: 4+  Internal rotation: 4+    Left Knee   Flexion: 4+  Extension: 4+    Right Knee   Flexion: 4+  Extension: 4    Left Ankle/Foot   Dorsiflexion: 5    Right Ankle/Foot   Dorsiflexion: 5    Tests     Lumbar     Left   Negative crossed SLR, passive SLR and quadrant  Right   Negative crossed SLR, passive SLR and quadrant  Left Hip   Positive FADIR  Negative RADHA  Right Hip   Negative RADHA and FADIR       Functional Assessment        Single Leg Stance   Left: 30 seconds  Right: 30 seconds    Comments  Tandem stance: 30 seconds chaim             Precautions: hx anxiety, depression, HTN        Manuals 3/1 3/3 3/8 3/10 3/15 3/17 3/22  3/24   Light piriformis stretch   30"x3 ea 3x30" L 3x30" L 30"x3 L 30"x3 L 30"x3 L     Long axis knee distraction with posterior mobs       5 min np np       L hip PROM       5 min 5 min 5 min 5 min     measurements                20 min   Neuro Re-Ed                   SLS issued 3x30" 30"x3 ea 3x30" chaim 3x30" chaim 30"x3 ea 30"x3 ea 30"x3 ea     Abdominal bracing issued 10" x10 10" 10x 10" 10x 10" x10 10" x10 10" x10     Mini squat 15x x15 20x 15x x15 x20 x20   Sidestepping foam   x3 ea 4 laps 4 laps x4 ea x4 ea x4 ea     Walking forward beam     4 laps 4 laps x4 ea x4 ea x4 ea     Sidestepping band       2 laps yellow Yellow x3 ea Yellow x3 ea yellow x3 ea                         Ther Ex                   Recumbent bike   5 min bike 6 min 7 min 10 min 10 min 10 min  10 min   SLR 5" 10x issued L 5"x10 L 5" 12x L 5" 12x L 5"x12 L 5"x15L 5"x15 L     LLLD knee extension stretch     4 min 3 min 3 min 4 min 3 min  hold    HEP review                10 min                                                                                   Ther Activity                                                           Gait Training                                                           Modalities

## 2021-03-29 ENCOUNTER — APPOINTMENT (OUTPATIENT)
Dept: PHYSICAL THERAPY | Facility: REHABILITATION | Age: 76
End: 2021-03-29
Payer: MEDICARE

## 2021-03-31 ENCOUNTER — OFFICE VISIT (OUTPATIENT)
Dept: PHYSICAL THERAPY | Facility: REHABILITATION | Age: 76
End: 2021-03-31
Payer: MEDICARE

## 2021-03-31 DIAGNOSIS — M16.12 PRIMARY OSTEOARTHRITIS OF ONE HIP, LEFT: Primary | ICD-10-CM

## 2021-03-31 PROCEDURE — 97110 THERAPEUTIC EXERCISES: CPT

## 2021-03-31 PROCEDURE — 97140 MANUAL THERAPY 1/> REGIONS: CPT

## 2021-03-31 NOTE — PROGRESS NOTES
Daily Note     Today's date: 3/31/2021  Patient name: Anika Ann  : 1945  MRN: [id number]  Referring provider: Francisco J Abdi MD  Dx:   Encounter Diagnosis     ICD-10-CM    1  Primary osteoarthritis of one hip, left  M16 12                   Subjective: Pt reports that her L knee is feeling moderately painful 4/10 with her L hip feeling well today  Objective: See treatment diary below      Assessment: Tolerated treatment well  Continued with current POC today with all exercises being performed to tolerance  Hip mobility is doing well with most tightness present in L piriformis  She felt better to end session  Patient demonstrated fatigue post treatment, exhibited good technique with therapeutic exercises and would benefit from continued PT      Plan: Continue per plan of care        Precautions: hx anxiety, depression, HTN        Manuals 3/31  3/8 3/10 3/15 3/17 3/22  3/24   Light piriformis stretch 30"x3 L  3x30" L 3x30" L 30"x3 L 30"x3 L 30"x3 L     Long axis knee distraction with posterior mobs     5 min np np       L hip PROM 5 min    5 min 5 min 5 min 5 min     measurements              20 min   Neuro Re-Ed                 SLS 30"x3 ea  3x30" chaim 3x30" chaim 30"x3 ea 30"x3 ea 30"x3 ea     Abdominal bracing 10"x10  10" 10x 10" 10x 10" x10 10" x10 10" x10     Mini squat x20  20x 15x x15 x20 x20     Sidestepping foam x4 ea  4 laps 4 laps x4 ea x4 ea x4 ea     Walking forward beam x4 ea  4 laps 4 laps x4 ea x4 ea x4 ea     Sidestepping band Yellow 3x ea    2 laps yellow Yellow x3 ea Yellow x3 ea yellow x3 ea                       Ther Ex                 Recumbent bike 10 min  6 min 7 min 10 min 10 min 10 min  10 min   SLR 5"x15  5" 12x L 5" 12x L 5"x12 L 5"x15L 5"x15 L     LLLD knee extension stretch hold  4 min 3 min 3 min 4 min 3 min  hold    HEP review              10 min                                                                           Ther Activity                                                     Gait Training                                                     Modalities

## 2021-04-05 ENCOUNTER — APPOINTMENT (OUTPATIENT)
Dept: PHYSICAL THERAPY | Facility: REHABILITATION | Age: 76
End: 2021-04-05
Payer: MEDICARE

## 2021-04-07 ENCOUNTER — OFFICE VISIT (OUTPATIENT)
Dept: PHYSICAL THERAPY | Facility: REHABILITATION | Age: 76
End: 2021-04-07
Payer: MEDICARE

## 2021-04-07 DIAGNOSIS — M16.12 PRIMARY OSTEOARTHRITIS OF ONE HIP, LEFT: Primary | ICD-10-CM

## 2021-04-07 PROCEDURE — 97110 THERAPEUTIC EXERCISES: CPT

## 2021-04-07 PROCEDURE — 97112 NEUROMUSCULAR REEDUCATION: CPT

## 2021-04-07 NOTE — PROGRESS NOTES
Daily Note     Today's date: 2021  Patient name: Veto Bedoya  : 1945  MRN: [id number]  Referring provider: Flor Tsang MD  Dx:   Encounter Diagnosis     ICD-10-CM    1  Primary osteoarthritis of one hip, left  M16 12        Start Time: 1030  Stop Time: 1120  Total time in clinic (min): 50 minutes    Subjective: Pt reports L knee has been bothering her more lately and is unsure when it started, notes L hip is doing well with minimal pain and denies pain currently  Objective: See treatment diary below  Precautions: hx anxiety, depression, HTN        Manuals 3/31 4/7 3/8 3/10 3/15 3/17 3/22  3/24   Light piriformis stretch 30"x3 L 30"x3 L 3x30" L 3x30" L 30"x3 L 30"x3 L 30"x3 L     Long axis knee distraction with posterior mobs     5 min np np       L hip PROM 5 min 5 min   5 min 5 min 5 min 5 min     measurements              20 min   Neuro Re-Ed                 SLS 30"x3 ea 30"x3 ea 3x30" chaim 3x30" chaim 30"x3 ea 30"x3 ea 30"x3 ea     Abdominal bracing 10"x10 10"x10 10" 10x 10" 10x 10" x10 10" x10 10" x10     Mini squat x20 x20 20x 15x x15 x20 x20     Sidestepping foam x4 ea x5 ea 4 laps 4 laps x4 ea x4 ea x4 ea     Walking forward beam x4 ea x5 ea 4 laps 4 laps x4 ea x4 ea x4 ea     Sidestepping band Yellow 3x ea Red x3 ea   2 laps yellow Yellow x3 ea Yellow x3 ea yellow x3 ea                       Ther Ex                 Recumbent bike 10 min 10 min 6 min 7 min 10 min 10 min 10 min  10 min   SLR 5"x15 5"x15 5" 12x L 5" 12x L 5"x12 L 5"x15L 5"x15 L     LLLD knee extension stretch hold hold 4 min 3 min 3 min 4 min 3 min  hold    HEP review              10 min                                                                           Ther Activity                                                     Gait Training                                                     Modalities                                                       Assessment: Tolerated treatment well   Progressed side stepping to red band with increased challenge and gluteal fatigue  Good technique with squatting  Mild instability demonstrated at times with balance in SLS and on soft foam beam, but is able to self correct and no overt LOB demonstrated  Mild pain in groin with piriformis stretching initially that was alleviated with position change and appropriate stretch noted after change    Patient demonstrated fatigue post treatment and would benefit from continued PT      Plan: Continue per plan of care

## 2021-04-12 ENCOUNTER — APPOINTMENT (OUTPATIENT)
Dept: PHYSICAL THERAPY | Facility: REHABILITATION | Age: 76
End: 2021-04-12
Payer: MEDICARE

## 2021-04-14 ENCOUNTER — APPOINTMENT (OUTPATIENT)
Dept: PHYSICAL THERAPY | Facility: REHABILITATION | Age: 76
End: 2021-04-14
Payer: MEDICARE

## 2021-04-21 ENCOUNTER — EVALUATION (OUTPATIENT)
Dept: PHYSICAL THERAPY | Facility: REHABILITATION | Age: 76
End: 2021-04-21
Payer: MEDICARE

## 2021-04-21 DIAGNOSIS — M16.12 PRIMARY OSTEOARTHRITIS OF ONE HIP, LEFT: Primary | ICD-10-CM

## 2021-04-21 PROCEDURE — 97110 THERAPEUTIC EXERCISES: CPT | Performed by: PHYSICAL THERAPIST

## 2021-04-21 PROCEDURE — 97140 MANUAL THERAPY 1/> REGIONS: CPT | Performed by: PHYSICAL THERAPIST

## 2021-04-21 NOTE — PROGRESS NOTES
PT Discharge    Today's date: 2021  Patient name: Victorino Gonsales  :   MRN: [id number]  Referring provider: Akosua Flaherty MD  Dx:   Encounter Diagnosis     ICD-10-CM    1  Primary osteoarthritis of one hip, left  M16 12        Start Time: 1030  Stop Time: 1110  Total time in clinic (min): 40 minutes    Assessment  Assessment details: Patient is a 66 y o  female that presents with left hip pain  Patient reports 80% improvement with skilled physical therapy services  Patient's FOTO improved by 9 points to 59/100  Patient reports improvement with overall pain, ambulation, sitting tolerance, and house/yard work  Patient reports continued difficulty with ambulation, sitting tolerance, and house/yard work  Patient has made good progress towards goals established for physical therapy  Patient would benefit from HEP for continued LE strengthening to maximize function  Patient discharged to [facility] at this time  Impairments: abnormal or restricted ROM, activity intolerance and impaired physical strength  Understanding of Dx/Px/POC: good   Prognosis: fair  Prognosis details: chronic    Goals  Impairment:  1  Patient will reports 50% reduction in pain in 4 weeks to maximize function  -PARTIALLY MET  2  Patient will improve strength to 4/5 in all planes to maximize function  -PARTIALLY MET  3  Patient will improve ROM to [facility] in 4 weeks to maximize function  -MET    Functional:  1  Patient will improve FOTO by 10 points to 60/100 in 4 weeks to maximize function  -PARTIALLY MET  2  Patient will be independent with HEP in 4 weeks to maximize function  -MET  3  Patient will report no difficulty with house/yard work in 4 weeks to maximize function  -PARTIALLY MET  4  Patient will report no difficulty with prolonged sitting in 4 weeks to maximize function  -PARTIALLY MET  5  Patient will report no difficulty with ambulation in 4 weeks to maximize function  -PARTIALLY MET        Plan  Plan details: Patient will be discharged to [facility] at this time  Planned modality interventions: cryotherapy and thermotherapy: hydrocollator packs  Planned therapy interventions: activity modification, abdominal trunk stabilization, manual therapy, patient education, postural training, stretching, strengthening, therapeutic activities, therapeutic exercise, home exercise program, functional ROM exercises, neuromuscular re-education and balance  Treatment plan discussed with: patient        Subjective Evaluation    History of Present Illness  Mechanism of injury: Patient presents with left hip pain for the last several years  Patient had an injection in her left hip which has been helpful  Patient reports a history of spinal stenosis over the last several years with pain down her left leg to knee  Patient reports feeling a stinging in her knee  Patient reports her pain tends to be worst when in one position for too long  For example sitting can be helpful, but then feels she needs to stand  Patient reports difficulty with ambulation, sitting, and anything in one position for an extended period of time  Patient is responsible to a lot around her property including care for chickens, geese, peacocks, etc   Patient describes her pain as in the left groin or posterior buttock  Patient reports no change in bowel or bladder    Pain  At best pain ratin (L knee: 1/10)  At worst pain ratin (L knee: 8/10)  Location: L hip  Quality: dull ache (knee: stinging)  Relieving factors: heat  Aggravating factors: sitting and standing  Progression: improved      Diagnostic Tests  X-ray: abnormal (OA)  Treatments  Previous treatment: injection treatment  Current treatment: physical therapy  Patient Goals  Patient goals for therapy: increased strength and decreased pain          Objective     Concurrent Complaints  Negative for bladder dysfunction, bowel dysfunction and saddle (S4) numbness    Active Range of Motion     Lumbar   Normal active range of motion    Passive Range of Motion   Left Hip   Flexion: [facility] [place]  External rotation (90/90): [facility] [place]  Internal rotation (90/90): WFL    Right Hip   Flexion: [facility] [place]  External rotation (90/90): [facility] [place]  Internal rotation (90/90): WFL  Left Knee   Flexion: WFL  Extension: Left knee passive extension: slight knee flexion contracture  Right Knee   Normal passive range of motion    Additional Passive Range of Motion Details  Hamstring length: WFL chaim    Strength/Myotome Testing     Left Hip   Planes of Motion   Flexion: 4+  Extension: 4  Abduction: 4+  External rotation: 4+  Internal rotation: 4+    Right Hip   Planes of Motion   Flexion: 4+  Extension: 4  Abduction: 4+  External rotation: 4+  Internal rotation: 4+    Left Knee   Flexion: 4+  Extension: 4    Right Knee   Flexion: 4+  Extension: 4+    Left Ankle/Foot   Dorsiflexion: 5    Right Ankle/Foot   Dorsiflexion: 5    Tests     Lumbar     Left   Negative crossed SLR, passive SLR and quadrant  Right   Negative crossed SLR, passive SLR and quadrant  Left Hip   Positive FADIR  Negative RADHA  Right Hip   Negative RADHA and FADIR       Functional Assessment        Single Leg Stance   Left: 30 seconds  Right: 30 seconds    Comments  Tandem stance: 30 seconds chaim       Precautions: hx anxiety, depression, HTN        Manuals 3/31 4/7 4/21 3/10 3/15 3/17 3/22  3/24   Light piriformis stretch 30"x3 L 30"x3 L  3x30" L 30"x3 L 30"x3 L 30"x3 L     Long axis knee distraction with posterior mobs       5 min np np       L hip PROM 5 min 5 min   5 min 5 min 5 min 5 min     measurements      25 min          20 min   Neuro Re-Ed                   SLS 30"x3 ea 30"x3 ea  3x30" chaim 30"x3 ea 30"x3 ea 30"x3 ea     Abdominal bracing 10"x10 10"x10  10" 10x 10" x10 10" x10 10" x10     Mini squat x20 x20  15x x15 x20 x20     Sidestepping foam x4 ea x5 ea  4 laps x4 ea x4 ea x4 ea     Walking forward beam x4 ea x5 ea  4 laps x4 ea x4 ea x4 ea     Sidestepping band Yellow 3x ea Red x3 ea   2 laps yellow Yellow x3 ea Yellow x3 ea yellow x3 ea                         Ther Ex                   Recumbent bike 10 min 10 min 5 min 7 min 10 min 10 min 10 min  10 min   SLR 5"x15 5"x15  5" 12x L 5"x12 L 5"x15L 5"x15 L     LLLD knee extension stretch hold hold  3 min 3 min 4 min 3 min  hold    HEP review      10 min          10 min                                                                                   Ther Activity                                                           Gait Training                                                           Modalities

## 2021-04-28 ENCOUNTER — APPOINTMENT (OUTPATIENT)
Dept: PHYSICAL THERAPY | Facility: REHABILITATION | Age: 76
End: 2021-04-28
Payer: MEDICARE

## 2021-05-18 ENCOUNTER — OFFICE VISIT (OUTPATIENT)
Dept: OBGYN CLINIC | Facility: HOSPITAL | Age: 76
End: 2021-05-18
Payer: MEDICARE

## 2021-05-18 VITALS
SYSTOLIC BLOOD PRESSURE: 134 MMHG | DIASTOLIC BLOOD PRESSURE: 69 MMHG | WEIGHT: 147.6 LBS | HEART RATE: 55 BPM | BODY MASS INDEX: 21.86 KG/M2 | HEIGHT: 69 IN

## 2021-05-18 DIAGNOSIS — M16.12 PRIMARY OSTEOARTHRITIS OF ONE HIP, LEFT: Primary | ICD-10-CM

## 2021-05-18 DIAGNOSIS — M46.1 SACROILIITIS (HCC): ICD-10-CM

## 2021-05-18 DIAGNOSIS — M25.552 LEFT HIP PAIN: ICD-10-CM

## 2021-05-18 DIAGNOSIS — M48.061 SPINAL STENOSIS OF LUMBAR REGION, UNSPECIFIED WHETHER NEUROGENIC CLAUDICATION PRESENT: ICD-10-CM

## 2021-05-18 PROCEDURE — 99213 OFFICE O/P EST LOW 20 MIN: CPT | Performed by: ORTHOPAEDIC SURGERY

## 2021-05-18 NOTE — PROGRESS NOTES
Assessment:   Diagnosis ICD-10-CM Associated Orders   1  Primary osteoarthritis of one hip, left  M16 12    2  Left hip pain  M25 552        Plan:  * Explained to the patient that since she had at least 1 month relief with a intra-articular hip injection, shows that the pain is being caused by hip pathology  * The left knee is bothering her most likely from quadriceps weakness  Patient with history of lumbosacral stenosis  Stenosis involving L3-4, L4-5  Patient had been seeing Dr Jonah López in the past   At this time, I would like patient to also follow up with Dr Jaime Engel for further evaluation  * She will  Be scheduled for Toradol injection to left hip  I do believe she does have hip pathology but I do believe some of her instability with buckling of the left knee is due to spinal pathology  * Will follow up with patient within the next 2-3 months       To do next visit:  No follow-ups on file  The above stated was discussed in layman's terms and the patient expressed understanding  All questions were answered to the patient's satisfaction  Scribe Attestation    I,:  Elijah Reyes am acting as a scribe while in the presence of the attending physician :       I,:  Emily Hodges MD personally performed the services described in this documentation    as scribed in my presence :             Subjective: Gamal Hunt is a 66 y o  female who presents  today for 3 month follow-up for her left hip pain and osteoarthritis  Patient has treated conservatively for moderate arthritis in the hip that has been causing her pain for last 2 years  At her last visit a intra-articular hip injection was ordered that was performed on 02/26/2021 that gave her about 1 month  She notes that she has groin pain but does have pain in the buttock as well  She notes no pain when she goes to tie her shoes      At the time she does have some overlapping lumbar radiculopathy and has been attending physical therapy for both the left hip and lumbar spine  The therapy has helped with her quadriceps strength  She get medial knee 'stinging' sensation in the knee  She feels that her knee 'gives out'  Review of systems negative unless otherwise specified in HPI  Review of Systems   Constitutional: Negative for chills, fever and unexpected weight change  HENT: Negative for hearing loss, nosebleeds and sore throat  Eyes: Negative for pain, redness and visual disturbance  Respiratory: Negative for cough, shortness of breath and wheezing  Cardiovascular: Negative for chest pain, palpitations and leg swelling  Gastrointestinal: Negative for abdominal pain and nausea  Genitourinary: Negative for dyspareunia, dysuria and frequency  Skin: Negative for rash and wound  Neurological: Negative for dizziness, numbness and headaches  Psychiatric/Behavioral: Negative for decreased concentration and suicidal ideas  The patient is not nervous/anxious          Past Medical History:   Diagnosis Date    Anxiety     [facility]     Cardiac dysrhythmia     [facility]     [facility]     Depression     Hyperlipidemia     Hyperthyroidism     Hypothyroid     Palpitation     Post-nasal drip        Past Surgical History:   Procedure Laterality Date    FL INJECTION LEFT HIP (NON ARTHROGRAM)  2/26/2021    JOINT REPLACEMENT Right     partial right knee replacement       Family History   Problem Relation Age of Onset    Arthritis Mother     Heart failure Father     Cancer Father        Social History     Occupational History    Not on file   Tobacco Use    Smoking status: Former Smoker    Smokeless tobacco: Never Used   Substance and Sexual Activity    Alcohol use: Yes     Comment: occasional     Drug use: No    Sexual activity: Not on file         Current Outpatient Medications:     ALPRAZolam (XANAX) 0 25 mg tablet, Take 1 tablet (0 25 mg total) by mouth 2 (two) times a day as needed for anxiety, Disp: 60 tablet, Rfl: 5    carvedilol (COREG) 12 5 mg tablet, Take 1 tablet (12 5 mg total) by mouth 2 (two) times a day, Disp: 180 tablet, Rfl: 3    cholecalciferol (VITAMIN D3) 1,000 units tablet, Take by mouth, Disp: , Rfl:     ezetimibe (ZETIA) 10 mg tablet, Take 1 tablet (10 mg total) by mouth daily, Disp: 90 tablet, Rfl: 3    gabapentin (NEURONTIN) 300 mg capsule, Take 1 capsule (300 mg total) by mouth 3 (three) times a day (Patient taking differently: Take 300 mg by mouth daily ), Disp: 60 capsule, Rfl: 1    levothyroxine 100 mcg tablet, Take 1 tablet (100 mcg total) by mouth daily, Disp: 90 tablet, Rfl: 3    pitavastatin (Livalo) 2 mg, Take 1 tablet (2 mg total) by mouth daily, Disp: 90 tablet, Rfl: 3    sertraline (ZOLOFT) 100 mg tablet, Take 1 tablet (100 mg total) by mouth daily, Disp: 90 tablet, Rfl: 3    gabapentin (NEURONTIN) 300 mg capsule, Take 1 capsule (300 mg total) by mouth 3 (three) times a day (Patient taking differently: Take 300 mg by mouth daily ), Disp: 270 capsule, Rfl: 0    meloxicam (MOBIC) 15 mg tablet, Take 1 tablet (15 mg total) by mouth daily (Patient not taking: Reported on 1/11/2021), Disp: 90 tablet, Rfl: 0    Allergies   Allergen Reactions    Methimazole             Vitals:    05/18/21 1150   BP: 134/69   Pulse: 55       Objective:                    Left Hip Exam     Tenderness   Left hip tenderness location: groin  Range of Motion   Flexion: 110   Left hip external rotation: no pain  Left hip internal rotation: no pain       Muscle Strength   Abduction: 5/5   Adduction: 5/5   Flexion: 5/5     Other   Erythema: absent  Sensation: normal  Pulse: present    Comments:  Calf is soft and non  tender            Diagnostics, reviewed and taken today if performed as documented:    None performed      The attending physician has personally reviewed the pertinent films in PACS and interpretation is as follows:      Procedures, if performed today:    Procedures    None performed      Portions of the record may have been created with voice recognition software  Occasional wrong word or "sound a like" substitutions may have occurred due to the inherent limitations of voice recognition software  Read the chart carefully and recognize, using context, where substitutions have occurred

## 2021-05-24 ENCOUNTER — HOSPITAL ENCOUNTER (OUTPATIENT)
Dept: RADIOLOGY | Facility: HOSPITAL | Age: 76
Discharge: HOME | End: 2021-05-24
Attending: ORTHOPAEDIC SURGERY

## 2021-05-24 ENCOUNTER — OFFICE VISIT (OUTPATIENT)
Dept: OBGYN CLINIC | Facility: HOSPITAL | Age: 76
End: 2021-05-24
Payer: MEDICARE

## 2021-05-24 VITALS
SYSTOLIC BLOOD PRESSURE: 145 MMHG | HEART RATE: 61 BPM | DIASTOLIC BLOOD PRESSURE: 68 MMHG | BODY MASS INDEX: 21.77 KG/M2 | HEIGHT: 69 IN | WEIGHT: 147 LBS

## 2021-05-24 DIAGNOSIS — M51.36 DDD (DEGENERATIVE DISC DISEASE), LUMBAR: ICD-10-CM

## 2021-05-24 DIAGNOSIS — M54.16 LUMBAR RADICULOPATHY: Primary | ICD-10-CM

## 2021-05-24 DIAGNOSIS — M46.1 SACROILIITIS (HCC): ICD-10-CM

## 2021-05-24 DIAGNOSIS — M48.062 SPINAL STENOSIS OF LUMBAR REGION WITH NEUROGENIC CLAUDICATION: ICD-10-CM

## 2021-05-24 PROCEDURE — 99213 OFFICE O/P EST LOW 20 MIN: CPT | Performed by: ORTHOPAEDIC SURGERY

## 2021-05-24 NOTE — NURSING NOTE
Call placed to patient to discuss upcoming appointment at [place] [address] radiology department and complete consultation with patient  Patient is having left hip injection utilizing fluoroscopy  Reviewed  patient's allergies , no current anticoagulant medication present per patient, Patient has had procedure in the past, no questions when asked if any questions or concerns  Reminded patient of location and time expected for procedure, Patient expressed understanding by verbalizing and repeating instructions

## 2021-05-24 NOTE — PROGRESS NOTES
Assessment:   Diagnosis ICD-10-CM Associated Orders   1  Lumbar radiculopathy  M54 16    2  DDD (degenerative disc disease), lumbar  M51 36 Ambulatory referral to Orthopedic Surgery   3  Spinal stenosis of lumbar region with neurogenic claudication  M48 062        Plan:    Reviewed MRI lumbar with patient again she has in system multilevel ddd, bulging discs- stenosis multiple levels with neurogenic claudication causing radiating symptoms down left leg, intermittent bilateral, numbness, giving out sensation of left leg  She remains neurologically stable  Discussed to see pain management for continued injections vs surgery laminectomy, decompression  Also advised 2nd opinion with another surgeon  Due to MRI being outdated will get new MRI to help with decision about pain management or surgery  See patient back in 3 weeks to review MRI  To do next visit:  No follow-ups on file  The above stated was discussed in layman's terms and the patient expressed understanding  All questions were answered to the patient's satisfaction  Scribe Attestation    I,:  Freddie Schwartz am acting as a scribe while in the presence of the attending physician :       I,:  Matthew Car MD personally performed the services described in this documentation    as scribed in my presence :             Subjective: Alisha Peres is a 66 y o  female who presents for evaluation of left lower back with radiating symptoms affecting left leg  Referred by Dr Iliana Shannon  She has history of left sided stenosis L3/4, L4/5 and has treated with Dr Yaw Hartley in past for transforaminal injections left L3 and L4  She states the injections did help but still had numbness in her feet  More recently her biggest issues is daily buckling of her left knee with intermittent pain down both legs, with continued numbness in feet  Pain is deep in her back and chronic in nature  She will have to modify activities throughout the day    Denies any bowel or bladder issues  Review of systems negative unless otherwise specified in HPI  Review of Systems   Constitutional: Negative for chills and fever  HENT: Negative for ear pain and sore throat  Eyes: Negative for pain and visual disturbance  Respiratory: Negative for cough and shortness of breath  Cardiovascular: Negative for chest pain and palpitations  Gastrointestinal: Negative for abdominal pain and vomiting  Genitourinary: Negative for dysuria and hematuria  Musculoskeletal: Negative for arthralgias and back pain  Skin: Negative for color change and rash  Neurological: Negative for seizures and syncope  All other systems reviewed and are negative        Past Medical History:   Diagnosis Date    Anxiety     [facility] fibrillation [facility]     Cardiac dysrhythmia     [facility]omyopathy [facility]     [facility]omyopathy [facility]     Depression     Hyperlipidemia     Hyperthyroidism     Hypothyroid     Palpitation     Post-nasal drip        Past Surgical History:   Procedure Laterality Date    FL INJECTION LEFT HIP (NON ARTHROGRAM)  2/26/2021    JOINT REPLACEMENT Right     partial right knee replacement       Family History   Problem Relation Age of Onset    Arthritis Mother     Heart failure Father     Cancer Father        Social History     Occupational History    Not on file   Tobacco Use    Smoking status: Former Smoker    Smokeless tobacco: Never Used   Substance and Sexual Activity    Alcohol use: Yes     Comment: occasional     Drug use: No    Sexual activity: Not on file         Current Outpatient Medications:     ALPRAZolam (XANAX) 0 25 mg tablet, Take 1 tablet (0 25 mg total) by mouth 2 (two) times a day as needed for anxiety, Disp: 60 tablet, Rfl: 5    carvedilol (COREG) 12 5 mg tablet, Take 1 tablet (12 5 mg total) by mouth 2 (two) times a day, Disp: 180 tablet, Rfl: 3    cholecalciferol (VITAMIN D3) 1,000 units tablet, Take by mouth, Disp: , Rfl:     ezetimibe (ZETIA) 10 mg tablet, Take 1 tablet (10 mg total) by mouth daily, Disp: 90 tablet, Rfl: 3    gabapentin (NEURONTIN) 300 mg capsule, Take 1 capsule (300 mg total) by mouth 3 (three) times a day (Patient taking differently: Take 300 mg by mouth daily ), Disp: 60 capsule, Rfl: 1    levothyroxine 100 mcg tablet, Take 1 tablet (100 mcg total) by mouth daily, Disp: 90 tablet, Rfl: 3    pitavastatin (Livalo) 2 mg, Take 1 tablet (2 mg total) by mouth daily, Disp: 90 tablet, Rfl: 3    sertraline (ZOLOFT) 100 mg tablet, Take 1 tablet (100 mg total) by mouth daily, Disp: 90 tablet, Rfl: 3    gabapentin (NEURONTIN) 300 mg capsule, Take 1 capsule (300 mg total) by mouth 3 (three) times a day (Patient taking differently: Take 300 mg by mouth daily ), Disp: 270 capsule, Rfl: 0    meloxicam (MOBIC) 15 mg tablet, Take 1 tablet (15 mg total) by mouth daily (Patient not taking: Reported on 1/11/2021), Disp: 90 tablet, Rfl: 0    Allergies   Allergen Reactions    Methimazole             Vitals:    05/24/21 1439   BP: 145/68   Pulse: 61       Objective:                    Back Exam     Comments:  Lumbar spine  No acute distress, skin intact  No specific tenderness to palpation  L2-S1 5/5 strength bilateral   L2-S1 sensation intact and symmetric               Diagnostics, reviewed and taken today if performed as documented:    None performed          Procedures, if performed today:    Procedures    None performed      Portions of the record may have been created with voice recognition software  Occasional wrong word or "sound a like" substitutions may have occurred due to the inherent limitations of voice recognition software  Read the chart carefully and recognize, using context, where substitutions have occurred

## 2021-06-01 ENCOUNTER — TRANSCRIBE ORDERS (OUTPATIENT)
Dept: ADMINISTRATIVE | Facility: HOSPITAL | Age: 76
End: 2021-06-01

## 2021-06-01 ENCOUNTER — HOSPITAL ENCOUNTER (OUTPATIENT)
Dept: RADIOLOGY | Facility: HOSPITAL | Age: 76
Discharge: HOME | End: 2021-06-01
Attending: PHYSICIAN ASSISTANT | Admitting: RADIOLOGY
Payer: MEDICARE

## 2021-06-01 DIAGNOSIS — M25.552 LEFT HIP PAIN: ICD-10-CM

## 2021-06-01 DIAGNOSIS — M16.12 PRIMARY OSTEOARTHRITIS OF ONE HIP, LEFT: ICD-10-CM

## 2021-06-01 PROCEDURE — 20610 DRAIN/INJ JOINT/BURSA W/O US: CPT

## 2021-06-01 PROCEDURE — 77002 NEEDLE LOCALIZATION BY XRAY: CPT

## 2021-06-01 RX ORDER — BUPIVACAINE HYDROCHLORIDE 2.5 MG/ML
30 INJECTION, SOLUTION EPIDURAL; INFILTRATION; INTRACAUDAL
Status: COMPLETED | OUTPATIENT
Start: 2021-06-01 | End: 2021-06-01

## 2021-06-01 RX ORDER — METHYLPREDNISOLONE ACETATE 80 MG/ML
80 INJECTION, SUSPENSION INTRA-ARTICULAR; INTRALESIONAL; INTRAMUSCULAR; SOFT TISSUE
Status: COMPLETED | OUTPATIENT
Start: 2021-06-01 | End: 2021-06-01

## 2021-06-01 RX ORDER — LIDOCAINE HYDROCHLORIDE 10 MG/ML
10 INJECTION, SOLUTION EPIDURAL; INFILTRATION; INTRACAUDAL; PERINEURAL
Status: COMPLETED | OUTPATIENT
Start: 2021-06-01 | End: 2021-06-01

## 2021-06-01 RX ADMIN — BUPIVACAINE HYDROCHLORIDE 4 ML: 2.5 INJECTION, SOLUTION EPIDURAL; INFILTRATION; INTRACAUDAL at 12:20

## 2021-06-01 RX ADMIN — IOHEXOL 3 ML: 300 INJECTION, SOLUTION INTRAVENOUS at 12:20

## 2021-06-01 RX ADMIN — METHYLPREDNISOLONE ACETATE 80 MG: 80 INJECTION, SUSPENSION INTRA-ARTICULAR; INTRALESIONAL; INTRAMUSCULAR; SOFT TISSUE at 12:20

## 2021-06-01 RX ADMIN — LIDOCAINE HYDROCHLORIDE 5 ML: 10 INJECTION, SOLUTION EPIDURAL; INFILTRATION; INTRACAUDAL; PERINEURAL at 12:20

## 2021-06-15 ENCOUNTER — HOSPITAL ENCOUNTER (OUTPATIENT)
Dept: RADIOLOGY | Facility: HOSPITAL | Age: 76
Discharge: HOME | End: 2021-06-15
Attending: ORTHOPAEDIC SURGERY
Payer: MEDICARE

## 2021-06-15 DIAGNOSIS — M48.062 SPINAL STENOSIS OF LUMBAR REGION WITH NEUROGENIC CLAUDICATION: ICD-10-CM

## 2021-06-15 DIAGNOSIS — M54.16 LUMBAR RADICULOPATHY: ICD-10-CM

## 2021-06-15 PROCEDURE — G1004 CDSM NDSC: HCPCS

## 2021-06-15 PROCEDURE — 72148 MRI LUMBAR SPINE W/O DYE: CPT

## 2021-06-21 ENCOUNTER — OFFICE VISIT (OUTPATIENT)
Dept: OBGYN CLINIC | Facility: HOSPITAL | Age: 76
End: 2021-06-21
Payer: MEDICARE

## 2021-06-21 VITALS
WEIGHT: 142 LBS | HEIGHT: 69 IN | HEART RATE: 62 BPM | DIASTOLIC BLOOD PRESSURE: 66 MMHG | BODY MASS INDEX: 21.03 KG/M2 | SYSTOLIC BLOOD PRESSURE: 125 MMHG

## 2021-06-21 DIAGNOSIS — M51.36 DDD (DEGENERATIVE DISC DISEASE), LUMBAR: Primary | ICD-10-CM

## 2021-06-21 DIAGNOSIS — M48.062 SPINAL STENOSIS OF LUMBAR REGION WITH NEUROGENIC CLAUDICATION: ICD-10-CM

## 2021-06-21 DIAGNOSIS — M54.16 LUMBAR RADICULOPATHY: ICD-10-CM

## 2021-06-21 PROCEDURE — 99213 OFFICE O/P EST LOW 20 MIN: CPT | Performed by: ORTHOPAEDIC SURGERY

## 2021-06-21 NOTE — PROGRESS NOTES
Assessment:   Diagnosis ICD-10-CM Associated Orders   1  DDD (degenerative disc disease), lumbar  M51 36    2  Spinal stenosis of lumbar region with neurogenic claudication  M48 062    3  Lumbar radiculopathy  M54 16        Plan:    Reviewed new MRI lumbar with patient again she has in system multilevel ddd, bulging discs- stenosis multiple levels L3/4, L4/5 moderate left, mild L5/S1with neurogenic claudication causing radiating symptoms down left leg, intermittent bilateral, numbness, giving out sensation of left leg  She remains neurologically stable with good strength  Discussed again decompression of L3/4, L4/5, partial at L5/S1 vs continue conservative treatment by pain management  She will try epidural injections at L3/4, L4/5  Continue with neurontin  See back in 4 weeks  To do next visit:  No follow-ups on file  The above stated was discussed in layman's terms and the patient expressed understanding  All questions were answered to the patient's satisfaction  Scribe Attestation    I,:  Margaree Galeazzi am acting as a scribe while in the presence of the attending physician :       I,:  Leno Mariano MD personally performed the services described in this documentation    as scribed in my presence :             Subjective: Mitchell Rausch is a 66 y o  female who presents for follow up regarding left lower back pain with radiating pain down left leg with associated numbness and tingling/weakness unchanged from previous appt  It will affect right leg as well but not as severe  Multilevel ddd, bulging disc with stenosis affecting left leg to her foot  Patient is limited with activity, sleep secondary to pain  Left leg will give out at times  Leans of shopping cart when out, hard to determine if back or leg pain is worse  She has failed conservative treatment with epidural injections by Dr Harvinder Skinner  Did discuss laminectomy with decompression at last appt     She is using neurontin 300mg at night        Review of systems negative unless otherwise specified in HPI  Review of Systems   Constitutional: Negative for chills and fever  HENT: Negative for ear pain and sore throat  Eyes: Negative for pain and visual disturbance  Respiratory: Negative for cough and shortness of breath  Cardiovascular: Negative for chest pain and palpitations  Gastrointestinal: Negative for abdominal pain and vomiting  Genitourinary: Negative for dysuria and hematuria  Musculoskeletal: Negative for arthralgias and back pain  Skin: Negative for color change and rash  Neurological: Negative for seizures and syncope  All other systems reviewed and are negative        Past Medical History:   Diagnosis Date    Anxiety     [facility] fibrillation [facility]     Cardiac dysrhythmia     Cardiomyopathy ([facility]     Cardiomyopathy ([facility]     Depression     Hyperlipidemia     Hyperthyroidism     Hypothyroid     Palpitation     Post-nasal drip        Past Surgical History:   Procedure Laterality Date    FL INJECTION LEFT HIP (NON ARTHROGRAM)  2/26/2021    FL INJECTION LEFT HIP (NON ARTHROGRAM)  6/1/2021    JOINT REPLACEMENT Right     partial right knee replacement       Family History   Problem Relation Age of Onset    Arthritis Mother     Heart failure Father     Cancer Father        Social History     Occupational History    Not on file   Tobacco Use    Smoking status: Former Smoker    Smokeless tobacco: Never Used   Substance and Sexual Activity    Alcohol use: Yes     Comment: occasional     Drug use: No    Sexual activity: Not on file         Current Outpatient Medications:     ALPRAZolam (XANAX) 0 25 mg tablet, Take 1 tablet (0 25 mg total) by mouth 2 (two) times a day as needed for anxiety, Disp: 60 tablet, Rfl: 5    carvedilol (COREG) 12 5 mg tablet, Take 1 tablet (12 5 mg total) by mouth 2 (two) times a day, Disp: 180 tablet, Rfl: 3    cholecalciferol (VITAMIN D3) 1,000 units tablet, Take by mouth, Disp: , Rfl:     ezetimibe (ZETIA) 10 mg tablet, Take 1 tablet (10 mg total) by mouth daily, Disp: 90 tablet, Rfl: 3    gabapentin (NEURONTIN) 300 mg capsule, Take 1 capsule (300 mg total) by mouth 3 (three) times a day (Patient taking differently: Take 300 mg by mouth daily ), Disp: 60 capsule, Rfl: 1    levothyroxine 100 mcg tablet, Take 1 tablet (100 mcg total) by mouth daily, Disp: 90 tablet, Rfl: 3    pitavastatin (Livalo) 2 mg, Take 1 tablet (2 mg total) by mouth daily, Disp: 90 tablet, Rfl: 3    sertraline (ZOLOFT) 100 mg tablet, Take 1 tablet (100 mg total) by mouth daily, Disp: 90 tablet, Rfl: 3    gabapentin (NEURONTIN) 300 mg capsule, Take 1 capsule (300 mg total) by mouth 3 (three) times a day (Patient taking differently: Take 300 mg by mouth daily ), Disp: 270 capsule, Rfl: 0    meloxicam (MOBIC) 15 mg tablet, Take 1 tablet (15 mg total) by mouth daily (Patient not taking: Reported on 1/11/2021), Disp: 90 tablet, Rfl: 0    Allergies   Allergen Reactions    Methimazole             Vitals:    06/21/21 1516   BP: 125/66   Pulse: 62       Objective:                    Back Exam     Comments:     Comments:  Lumbar spine  No acute distress, skin intact  No specific tenderness to palpation  L2-S1 5/5 strength bilateral   L2-S1 sensation intact and symmetric                   Diagnostics, reviewed and taken today if performed as documented: The attending physician has personally reviewed the pertinent films in PACS and interpretation is as follows:MR lumbar spine L3/4 ddd and disc herniation with right>left canal stenosis and foraminal nerve impingement, moderate can stenosis L4/5 secondary to bulging posterior disc, L5/S1 small left sided hernation abutting S1 nerve       Procedures, if performed today:    Procedures    None performed      Portions of the record may have been created with voice recognition software    Occasional wrong word or "sound a like" substitutions may have occurred due to the inherent limitations of voice recognition software  Read the chart carefully and recognize, using context, where substitutions have occurred

## 2021-06-22 ENCOUNTER — TELEPHONE (OUTPATIENT)
Dept: OBGYN CLINIC | Facility: HOSPITAL | Age: 76
End: 2021-06-22

## 2021-06-22 DIAGNOSIS — I10 ESSENTIAL (PRIMARY) HYPERTENSION: ICD-10-CM

## 2021-06-22 RX ORDER — LEVOTHYROXINE SODIUM 0.1 MG/1
100 TABLET ORAL DAILY
Qty: 90 TABLET | Refills: 3 | Status: SHIPPED | OUTPATIENT
Start: 2021-06-22 | End: 2021-12-17 | Stop reason: SDUPTHER

## 2021-06-22 NOTE — TELEPHONE ENCOUNTER
Dr Freeman       Patient is calling in requesting the pain management referral to be changed  She has followed up with Raji Amaro already, so she states she does not need a consult  She would like to know if the referral can be changed to getting the injection procedure instead?        #: [phone]

## 2021-06-23 NOTE — TELEPHONE ENCOUNTER
Spoke to patient  She stated that she has had this injection before but because it has been over 12 months they are telling her she needs to come in for the consult unless Dr Homero Abbott places the order specifically for the [facility] injection requested  She stated they wont just schedule her for the injection unless it is ordered without a consult  Please advise if this is something we can do or if she has to see them for the order to be placed  She stated it is [place] hour[place] drive and she wants to be sure before scheduling       Thank you

## 2021-06-23 NOTE — TELEPHONE ENCOUNTER
This referral is appropriate  It describes the injection we recommend  If she is already established, she can call pain management and they will get an appointment for her

## 2021-06-24 ENCOUNTER — TELEPHONE (OUTPATIENT)
Dept: PAIN MEDICINE | Facility: CLINIC | Age: 76
End: 2021-06-24

## 2021-06-24 ENCOUNTER — TELEPHONE (OUTPATIENT)
Dept: PAIN MEDICINE | Facility: MEDICAL CENTER | Age: 76
End: 2021-06-24

## 2021-06-24 NOTE — TELEPHONE ENCOUNTER
Left message for patient to call me back directly to schedule procedure Bilateral L3 TF IMELDA followed by Bilateral L4 TF IMELDA about 3-4 weeks later at

## 2021-06-30 ENCOUNTER — TELEPHONE (OUTPATIENT)
Dept: NEUROSURGERY | Facility: CLINIC | Age: 76
End: 2021-06-30

## 2021-06-30 NOTE — TELEPHONE ENCOUNTER
6/30/21  PT CALLED TO SCHEDULE APPT WITH DR ROWLEY FOR HERSELF AND   CALLED PT BACK  LINE IS BUSY  TRIED AGAIN  BUSY

## 2021-07-03 DIAGNOSIS — M48.061 SPINAL STENOSIS OF LUMBAR REGION, UNSPECIFIED WHETHER NEUROGENIC CLAUDICATION PRESENT: ICD-10-CM

## 2021-07-03 DIAGNOSIS — F41.9 ANXIETY: ICD-10-CM

## 2021-07-03 RX ORDER — GABAPENTIN 300 MG/1
300 CAPSULE ORAL 3 TIMES DAILY
Qty: 60 CAPSULE | Refills: 0 | Status: CANCELLED | OUTPATIENT
Start: 2021-07-03

## 2021-07-06 RX ORDER — SERTRALINE HYDROCHLORIDE 100 MG/1
100 TABLET, FILM COATED ORAL DAILY
Qty: 90 TABLET | Refills: 0 | OUTPATIENT
Start: 2021-07-06

## 2021-07-08 NOTE — TELEPHONE ENCOUNTER
Scheduled pt for B/L L3 Tfesi on 8/6/21 and B/L L4 Tfesi for 9/2/21  Pt denies rx blood thinners  Went over pre-procedure instructions below:  Nothing to eat or drink 1 hr prior to procedure  Need to arrange transportation  Proper clothing for procedure  If ill or placed on antibiotics please call to reschedule  Covid/travel/ and vaccine instructions

## 2021-07-12 ENCOUNTER — OFFICE VISIT (OUTPATIENT)
Dept: CARDIOLOGY CLINIC | Facility: CLINIC | Age: 76
End: 2021-07-12
Payer: MEDICARE

## 2021-07-12 VITALS
WEIGHT: 147.8 LBS | SYSTOLIC BLOOD PRESSURE: 116 MMHG | OXYGEN SATURATION: 98 % | HEIGHT: 69 IN | BODY MASS INDEX: 21.89 KG/M2 | DIASTOLIC BLOOD PRESSURE: 60 MMHG | HEART RATE: 54 BPM

## 2021-07-12 DIAGNOSIS — I42.9 CARDIOMYOPATHY, UNSPECIFIED TYPE (HCC): Primary | ICD-10-CM

## 2021-07-12 DIAGNOSIS — F41.9 ANXIETY AND DEPRESSION: ICD-10-CM

## 2021-07-12 DIAGNOSIS — I10 ESSENTIAL (PRIMARY) HYPERTENSION: ICD-10-CM

## 2021-07-12 DIAGNOSIS — F32.A ANXIETY AND DEPRESSION: ICD-10-CM

## 2021-07-12 DIAGNOSIS — E78.2 MIXED HYPERLIPIDEMIA: ICD-10-CM

## 2021-07-12 DIAGNOSIS — I48.0 PAROXYSMAL ATRIAL FIBRILLATION (HCC): ICD-10-CM

## 2021-07-12 DIAGNOSIS — F41.9 ANXIETY: ICD-10-CM

## 2021-07-12 PROCEDURE — 99214 OFFICE O/P EST MOD 30 MIN: CPT | Performed by: INTERNAL MEDICINE

## 2021-07-12 NOTE — PATIENT INSTRUCTIONS
The patient is advised to continue the same medications    Should the patient decide to proceed with surgery we will have to consider nuclear stress test and echocardiogram

## 2021-07-12 NOTE — ASSESSMENT & PLAN NOTE
History of paroxysmal atrial fibrillation, stable  The patient was initially noted to have atrial fibrillation in the context of hyperthyroidism  At this point the patient is euthyroid  We will continue the present medications

## 2021-07-12 NOTE — TELEPHONE ENCOUNTER
Refill Request - 90 day refill - Patient saw Dr Oumar Gonsalves today and forgot to ask for these refills

## 2021-07-12 NOTE — ASSESSMENT & PLAN NOTE
History of anxiety and depression, reasonably stable at this time  We will continue sertraline at 100 mg daily

## 2021-07-12 NOTE — PROGRESS NOTES
Assessment/Plan:    Cardiomyopathy ([facility] )    History of cardiomyopathy with normal ejection fraction on echocardiogram performed 2 years ago  We will continue present regimen  Paroxysmal atrial fibrillation (HCC)    History of paroxysmal atrial fibrillation, stable  The patient was initially noted to have atrial fibrillation in the context of hyperthyroidism  At this point the patient is euthyroid  We will continue the present medications  Mixed hyperlipidemia    Hyperlipidemia, stable  The patient will continue Livalo 2 mg daily and Zetia 10 mg daily  Anxiety and depression    History of anxiety and depression, reasonably stable at this time  We will continue sertraline at 100 mg daily  Diagnoses and all orders for this visit:    Cardiomyopathy, unspecified type ([facility] )    Paroxysmal atrial fibrillation ([place] [address] )    Mixed hyperlipidemia    Anxiety and depression          Subjective:   Mild dyspnea on exertion  Patient ID: Leobardo Reaves is a 66 y o  female  The patient presented to this office for the purpose of cardiac follow-up  She is known to have history of cardiomyopathy with paroxysmal atrial fibrillation as well as hypertension hyperlipidemia and anxiety disorder the patient has been experiencing some shortness of breath on exertion but denies any symptoms of chest pain or palpitation  She has no symptoms dizziness or lightheadedness  She has no leg edema  She has however been complaining of leg pain and was recently diagnosed with spinal stenosis  There is consideration for possible surgery in the future  The following portions of the patient's history were reviewed and updated as appropriate: allergies, current medications, past family history, past medical history, past social history, past surgical history and problem list     Review of Systems   Respiratory: Positive for shortness of breath  Negative for apnea, cough, chest tightness and wheezing      Cardiovascular: Negative for chest pain, palpitations and leg swelling  Gastrointestinal: Negative for abdominal pain  Neurological: Negative for dizziness and light-headedness  Psychiatric/Behavioral: Negative  Objective:  Stable cardiac-wise  /60 (BP Location: Right arm, Patient Position: Sitting, Cuff Size: Adult)   Pulse (!) 54   Ht 5' 9" (1 753 m)   Wt 67 kg (147 lb 12 8 oz)   SpO2 98%   BMI 21 83 kg/m²          Physical Exam  Vitals reviewed  Constitutional:       General: She is not in acute distress  Appearance: She is well-developed  She is not diaphoretic  HENT:      Head: Normocephalic and atraumatic  Neck:      Thyroid: No thyromegaly  Vascular: No JVD  Cardiovascular:      Rate and Rhythm: Normal rate and regular rhythm  Heart sounds: S1 normal and S2 normal  No murmur heard  No friction rub  No gallop  Pulmonary:      Effort: Pulmonary effort is normal  No respiratory distress  Breath sounds: No wheezing or rales  Chest:      Chest wall: No tenderness  Abdominal:      Palpations: Abdomen is soft  Musculoskeletal:      Cervical back: Normal range of motion and neck supple  Right lower leg: No edema  Left lower leg: No edema  Skin:     General: Skin is warm and dry  Neurological:      Mental Status: She is oriented to person, place, and time     Psychiatric:         Mood and Affect: Mood normal          Behavior: Behavior normal

## 2021-07-12 NOTE — ASSESSMENT & PLAN NOTE
History of cardiomyopathy with normal ejection fraction on echocardiogram performed 2 years ago  We will continue present regimen

## 2021-07-12 NOTE — LETTER
July 12, 2021     Jeannie Gomez,   [address] [facility]  [address] [address] [place] [address]    Patient: Kristen Dove   YOB: 1945   Date of Visit: 7/12/2021       Dear Dr Martinez:    Thank you for referring Kiko Hyatt to me for evaluation  Below are my notes for this consultation  If you have questions, please do not hesitate to call me  I look forward to following your patient along with you  Sincerely,        Clayton Bey MD        CC: No Recipients  Clayton Bey MD  7/12/2021 12:08 PM  Sign when Signing Visit  Assessment/Plan:    Cardiomyopathy [facility])    History of cardiomyopathy with normal ejection fraction on echocardiogram performed 2 years ago  We will continue present regimen  Paroxysmal atrial fibrillation (HCC)    History of paroxysmal atrial fibrillation, stable  The patient was initially noted to have atrial fibrillation in the context of hyperthyroidism  At this point the patient is euthyroid  We will continue the present medications  Mixed hyperlipidemia    Hyperlipidemia, stable  The patient will continue Livalo 2 mg daily and Zetia 10 mg daily  Anxiety and depression    History of anxiety and depression, reasonably stable at this time  We will continue sertraline at 100 mg daily  Diagnoses and all orders for this visit:    Cardiomyopathy, unspecified type ([facility] )    Paroxysmal atrial fibrillation ([facility] )    Mixed hyperlipidemia    Anxiety and depression          Subjective:   Mild dyspnea on exertion  Patient ID: Kristen Dove is a 66 y o  female  The patient presented to this office for the purpose of cardiac follow-up  She is known to have history of cardiomyopathy with paroxysmal atrial fibrillation as well as hypertension hyperlipidemia and anxiety disorder the patient has been experiencing some shortness of breath on exertion but denies any symptoms of chest pain or palpitation  She has no symptoms dizziness or lightheadedness    She has no leg edema   She has however been complaining of leg pain and was recently diagnosed with spinal stenosis  There is consideration for possible surgery in the future  The following portions of the patient's history were reviewed and updated as appropriate: allergies, current medications, past family history, past medical history, past social history, past surgical history and problem list     Review of Systems   Respiratory: Positive for shortness of breath  Negative for apnea, cough, chest tightness and wheezing  Cardiovascular: Negative for chest pain, palpitations and leg swelling  Gastrointestinal: Negative for abdominal pain  Neurological: Negative for dizziness and light-headedness  Psychiatric/Behavioral: Negative  Objective:  Stable cardiac-wise  /60 (BP Location: Right arm, Patient Position: Sitting, Cuff Size: Adult)   Pulse (!) 54   Ht 5' 9" (1 753 m)   Wt 67 kg (147 lb 12 8 oz)   SpO2 98%   BMI 21 83 kg/m²          Physical Exam  Vitals reviewed  Constitutional:       General: She is not in acute distress  Appearance: She is well-developed  She is not diaphoretic  HENT:      Head: Normocephalic and atraumatic  Neck:      Thyroid: No thyromegaly  Vascular: No JVD  Cardiovascular:      Rate and Rhythm: Normal rate and regular rhythm  Heart sounds: S1 normal and S2 normal  No murmur heard  No friction rub  No gallop  Pulmonary:      Effort: Pulmonary effort is normal  No respiratory distress  Breath sounds: No wheezing or rales  Chest:      Chest wall: No tenderness  Abdominal:      Palpations: Abdomen is soft  Musculoskeletal:      Cervical back: Normal range of motion and neck supple  Right lower leg: No edema  Left lower leg: No edema  Skin:     General: Skin is warm and dry  Neurological:      Mental Status: She is oriented to person, place, and time     Psychiatric:         Mood and Affect: Mood normal  Behavior: Behavior normal

## 2021-07-13 RX ORDER — SERTRALINE HYDROCHLORIDE 100 MG/1
100 TABLET, FILM COATED ORAL DAILY
Qty: 90 TABLET | Refills: 0 | Status: SHIPPED | OUTPATIENT
Start: 2021-07-13 | End: 2021-10-25 | Stop reason: SDUPTHER

## 2021-07-13 RX ORDER — CARVEDILOL 12.5 MG/1
12.5 TABLET ORAL 2 TIMES DAILY
Qty: 180 TABLET | Refills: 0 | Status: SHIPPED | OUTPATIENT
Start: 2021-07-13 | End: 2021-12-13 | Stop reason: SDUPTHER

## 2021-08-06 ENCOUNTER — HOSPITAL ENCOUNTER (OUTPATIENT)
Dept: RADIOLOGY | Facility: CLINIC | Age: 76
Discharge: HOME | End: 2021-08-06
Attending: ANESTHESIOLOGY
Payer: MEDICARE

## 2021-08-06 VITALS
TEMPERATURE: 97 F | RESPIRATION RATE: 18 BRPM | SYSTOLIC BLOOD PRESSURE: 155 MMHG | OXYGEN SATURATION: 97 % | HEART RATE: 55 BPM | DIASTOLIC BLOOD PRESSURE: 71 MMHG

## 2021-08-06 DIAGNOSIS — M54.16 LUMBAR RADICULOPATHY: ICD-10-CM

## 2021-08-06 DIAGNOSIS — M48.062 LUMBAR STENOSIS WITH NEUROGENIC CLAUDICATION: ICD-10-CM

## 2021-08-06 PROCEDURE — 64483 NJX AA&/STRD TFRM EPI L/S 1: CPT | Performed by: ANESTHESIOLOGY

## 2021-08-06 RX ORDER — METHYLPREDNISOLONE ACETATE 80 MG/ML
80 INJECTION, SUSPENSION INTRA-ARTICULAR; INTRALESIONAL; INTRAMUSCULAR; PARENTERAL; SOFT TISSUE ONCE
Status: COMPLETED | OUTPATIENT
Start: 2021-08-06 | End: 2021-08-06

## 2021-08-06 RX ORDER — BUPIVACAINE HCL/PF 2.5 MG/ML
10 VIAL (ML) INJECTION ONCE
Status: COMPLETED | OUTPATIENT
Start: 2021-08-06 | End: 2021-08-06

## 2021-08-06 RX ORDER — 0.9 % SODIUM CHLORIDE 0.9 %
10 VIAL (ML) INJECTION ONCE
Status: COMPLETED | OUTPATIENT
Start: 2021-08-06 | End: 2021-08-06

## 2021-08-06 RX ADMIN — BUPIVACAINE HYDROCHLORIDE 2 ML: 2.5 INJECTION, SOLUTION EPIDURAL; INFILTRATION; INTRACAUDAL at 11:54

## 2021-08-06 RX ADMIN — SODIUM CHLORIDE 4 ML: 9 INJECTION, SOLUTION INTRAMUSCULAR; INTRAVENOUS; SUBCUTANEOUS at 11:52

## 2021-08-06 RX ADMIN — METHYLPREDNISOLONE ACETATE 80 MG: 80 INJECTION, SUSPENSION INTRA-ARTICULAR; INTRALESIONAL; INTRAMUSCULAR; PARENTERAL; SOFT TISSUE at 11:54

## 2021-08-06 RX ADMIN — IOHEXOL 1 ML: 300 INJECTION, SOLUTION INTRAVENOUS at 11:53

## 2021-08-06 RX ADMIN — Medication 4 ML: at 11:52

## 2021-08-06 NOTE — H&P
History of Present Illness: The patient is a 66 y o  female who presents with complaints of lower back and leg pain secondary to spinal stenosis and is here today for bilateral L3 transforaminal epidural steroid injection      Patient Active Problem List   Diagnosis    Cardiomyopathy ([facility] )    Mixed hyperlipidemia    Paroxysmal atrial fibrillation (HCC)    Anxiety and depression    Primary osteoarthritis of left hip    Intervertebral disc disorder with radiculopathy of lumbar region    Lumbar stenosis with neurogenic claudication    Sacroiliitis (HCC)       Past Medical History:   Diagnosis Date    Anxiety     Atrial fibrillation ([facility] )     Cardiac dysrhythmia     Cardiomyopathy ([facility] )     Cardiomyopathy ([facility] )     Depression     Hyperlipidemia     Hyperthyroidism     Hypothyroid     Palpitation     Post-nasal drip        Past Surgical History:   Procedure Laterality Date    FL INJECTION LEFT HIP (NON ARTHROGRAM)  2/26/2021    FL INJECTION LEFT HIP (NON ARTHROGRAM)  6/1/2021    JOINT REPLACEMENT Right     partial right knee replacement         Current Outpatient Medications:     ALPRAZolam (XANAX) 0 25 mg tablet, Take 1 tablet (0 25 mg total) by mouth 2 (two) times a day as needed for anxiety, Disp: 60 tablet, Rfl: 5    carvedilol (COREG) 12 5 mg tablet, Take 1 tablet (12 5 mg total) by mouth 2 (two) times a day, Disp: 180 tablet, Rfl: 0    cholecalciferol (VITAMIN D3) 1,000 units tablet, Take by mouth, Disp: , Rfl:     ezetimibe (ZETIA) 10 mg tablet, Take 1 tablet (10 mg total) by mouth daily, Disp: 90 tablet, Rfl: 3    gabapentin (NEURONTIN) 300 mg capsule, Take 1 capsule (300 mg total) by mouth 3 (three) times a day, Disp: 60 capsule, Rfl: 1    levothyroxine 100 mcg tablet, Take 1 tablet (100 mcg total) by mouth daily, Disp: 90 tablet, Rfl: 3    meloxicam (MOBIC) 15 mg tablet, Take 1 tablet (15 mg total) by mouth daily (Patient not taking: Reported on 1/11/2021), Disp: 90 tablet, Rfl: 0   pitavastatin (Livalo) 2 mg, Take 1 tablet (2 mg total) by mouth daily, Disp: 90 tablet, Rfl: 3    sertraline (ZOLOFT) 100 mg tablet, Take 1 tablet (100 mg total) by mouth daily, Disp: 90 tablet, Rfl: 0    Current Facility-Administered Medications:     bupivacaine (PF) (MARCAINE) 0 25 % injection 10 mL, 10 mL, Epidural, Once, Dru Bueno MD    iohexol (OMNIPAQUE) 300 mg/mL injection 50 mL, 50 mL, Epidural, Once, Dru Bueno MD    lidocaine (PF) (XYLOCAINE-MPF) 2 % injection 5 mL, 5 mL, Infiltration, Once, Dru Bueno MD    methylPREDNISolone acetate (DEPO-MEDROL) injection 80 mg, 80 mg, Epidural, Once, Dru Bueno MD    sodium chloride (PF) 0 9 % injection 10 mL, 10 mL, Infiltration, Once, Dru Bueno MD    Allergies   Allergen Reactions    Methimazole        Physical Exam:   Vitals:    08/06/21 1139   BP: 118/52   Pulse: (!) 54   Resp: 18   Temp: (!) 97 °F (36 1 °C)   SpO2: 98%     General: Awake, Alert, Oriented x 3, Mood and affect appropriate  Respiratory: Respirations even and unlabored  Cardiovascular: Peripheral pulses intact; no edema  Musculoskeletal Exam:  Lower back tenderness    ASA Score: 3    Patient/Chart Verification  Patient ID Verified: Verbal  ID Band Applied: No  Consents Confirmed: Procedural, To be obtained in the Pre-Procedure area  H&P( within 30 days) Verified: To be obtained in the Pre-Procedure area  Interval H&P(within 24 hr) Complete (required for Outpatients and Surgery Admit only): To be obtained in the Pre-Procedure area  Allergies Reviewed: Yes  Anticoag/NSAID held?: NA  Currently on antibiotics?: No  Pregnancy denied?: NA    Assessment:   1  Lumbar radiculopathy    2   Lumbar stenosis with neurogenic claudication        Plan: B/L L3 Tfesi

## 2021-08-06 NOTE — DISCHARGE INSTR - LAB
Epidural Steroid Injection   WHAT YOU NEED TO KNOW:   An epidural steroid injection (IMELDA) is a procedure to inject steroid medicine into the epidural space  The epidural space is between your spinal cord and vertebrae  Steroids reduce inflammation and fluid buildup in your spine that may be causing pain  You may be given pain medicine along with the steroids  ACTIVITY  · Do not drive or operate machinery today  · No strenuous activity today - bending, lifting, etc   · You may resume normal activites starting tomorrow - start slowly and as tolerated  · You may shower today, but no tub baths or hot tubs  · You may have numbness for several hours from the local anesthetic  Please use caution and common sense, especially with weight-bearing activities  CARE OF THE INJECTION SITE  · If you have soreness or pain, apply ice to the area today (20 minutes on/20 minutes off)  · Starting tomorrow, you may use warm, moist heat or ice if needed  · You may have an increase or change in your discomfort for 36-48 hours after your treatment  · Apply ice and continue with any pain medication you have been prescribed  · Notify the [facility] if you have any of the following: redness, drainage, swelling, headache, stiff neck or fever above 100°F     SPECIAL INSTRUCTIONS  · Our office will contact you in approximately 7 days for a progress report  MEDICATIONS  · Continue to take all routine medications  · Our office may have instructed you to hold some medications  As no general anesthesia was used in today's procedure, you should not experience any side effects related to anesthesia  If you have a problem specifically related to your procedure, please call our office at [phone]  Problems not related to your procedure should be directed to your primary care physician

## 2021-08-13 ENCOUNTER — TELEPHONE (OUTPATIENT)
Dept: PAIN MEDICINE | Facility: CLINIC | Age: 76
End: 2021-08-13

## 2021-08-13 NOTE — TELEPHONE ENCOUNTER
Pt reports 30% improvement post inj   Pain level 2-3/10   Pt aware I will call next week for an update

## 2021-08-16 ENCOUNTER — OFFICE VISIT (OUTPATIENT)
Dept: NEUROSURGERY | Facility: CLINIC | Age: 76
End: 2021-08-16
Payer: MEDICARE

## 2021-08-16 VITALS
HEIGHT: 69 IN | DIASTOLIC BLOOD PRESSURE: 80 MMHG | WEIGHT: 140 LBS | TEMPERATURE: 97.9 F | BODY MASS INDEX: 20.73 KG/M2 | SYSTOLIC BLOOD PRESSURE: 138 MMHG

## 2021-08-16 DIAGNOSIS — M54.50 LOWER BACK PAIN: ICD-10-CM

## 2021-08-16 DIAGNOSIS — M51.16 INTERVERTEBRAL DISC DISORDER WITH RADICULOPATHY OF LUMBAR REGION: Primary | ICD-10-CM

## 2021-08-16 PROCEDURE — 99204 OFFICE O/P NEW MOD 45 MIN: CPT | Performed by: PHYSICIAN ASSISTANT

## 2021-08-16 NOTE — PROGRESS NOTES
Neurosurgery Office Note  Spenser Fitch 66 y o  female MRN: [id number]      Assessment/Plan     Intervertebral disc disorder with radiculopathy of lumbar region  · Presents for 2nd opinion for long standing LBP with radiculopathy    Imaging:   · MRI lumbar spine 6/15/2021: L3-4 degenerative disc disease with right greater than left canal stenosis and formaninal nerve impingement, moderate tricomparetment stenosis at L4-5 and left sided herniation with displacement of the left S1 nerve root  Plan:   · Continue to monitor neurological function  · Patient has completed multiple injections and PT within the last 6 months  · Follows with Dr Brenda Driscoll for pain management   · Previously was seen by Dr Rocco Cheney who recommended multilevel decompression  · Patient at this time is without clear radicular symptoms or dermatomal pattern  Patient L>R leg pain distally from knee with burning sensations  · No motor deficits on exam  LT intact throughout  Bilaterally patellar reflexes intact  · Will plan for EMG bilaterally lower limbs  If consistent with radiculopathy, can consider surgical intervention  · Also placed order for bending lumbar x-rays to rule out instability that would require fusion  · Patient expressed understanding with plan although was not happy to not have a clear surgical plan at this time  · Will plan to follow up after completion of EMG and XR lumbar spine  Encouraged to call with questions or concerns  Diagnoses and all orders for this visit:    Intervertebral disc disorder with radiculopathy of lumbar region  -     EMG 2 limb lower extremity; Future  -     X-ray lumbar spine flexion and extension only 4+ views;  Future    Lower back pain  -     Ambulatory referral to Neurosurgery            CHIEF COMPLAINT    Chief Complaint   Patient presents with    Consult     Low back pain       HISTORY    History of Present Illness     66y o  year old female     Patient is a 66 old female who presents to the outpatient neurosurgical office as a new patient consultation for 2nd opinion of her longstanding low back pain with lower extremity radiculopathy  Patient has a past medical history including cardiomyopathy, AFib left hip arthritis, hyperlipidemia, anxiety depression  Patient's  previously had surgery with Dr Karime Blandon which is the reason for 2nd opinion at this time  Patient was originally seen by Orthopedic surgery and recommended undergo a multilevel lumbar decompression surgery  Patient states she has a longstanding history of bilateral lower extremity difficulties primarily left greater than right side  Patient relates her pain and discomfort is primarily from her knee down and feels as though she has a burning sensation with electrical shock pains  She is unable to relate a specific dermatomal pattern for her pain  She is unable to state whether she has claudication symptoms when asked about her ambulatory ability  She states that everything she does causes her legs to hurt more  She states sitting down to rest does not necessarily improve her symptoms  She does endorse some back pain, but this is not a large hindrance to her day to day activity like her legs are  Patient has completed physical therapy and been undergoing injections with Dr Kevin De La Rosa  Patient states she just saw him about a week ago, but down not remember what she had done  Per chart review she has bilateral L3 transformaminal injection with only about 30% relief  She has no bowel or bladder dysfunction  She has limited pains in her upper legs  She has no myelopathy on exam        See Discussion    REVIEW OF SYSTEMS    Review of Systems   Constitutional: Negative  HENT: Negative  Eyes: Negative  Respiratory: Negative  Cardiovascular:        A-fib   Gastrointestinal: Negative  Endocrine: Negative  Genitourinary: Negative      Musculoskeletal: Positive for arthralgias (Pain in both legs but the left side is worse), back pain (Low- mid back pain), gait problem and neck pain  Symptoms for years  PT & Inj- Helped with hip pain but not back pain  Skin: Negative  Allergic/Immunologic: Negative  Neurological: Positive for dizziness, weakness (B/L legs), numbness (B/L legs) and headaches  Hematological: Negative  Meds/Allergies     Current Outpatient Medications   Medication Sig Dispense Refill    ALPRAZolam (XANAX) 0 25 mg tablet Take 1 tablet (0 25 mg total) by mouth 2 (two) times a day as needed for anxiety 60 tablet 5    carvedilol (COREG) 12 5 mg tablet Take 1 tablet (12 5 mg total) by mouth 2 (two) times a day 180 tablet 0    cholecalciferol (VITAMIN D3) 1,000 units tablet Take by mouth      ezetimibe (ZETIA) 10 mg tablet Take 1 tablet (10 mg total) by mouth daily 90 tablet 3    gabapentin (NEURONTIN) 300 mg capsule Take 1 capsule (300 mg total) by mouth 3 (three) times a day (Patient taking differently: Take 300 mg by mouth 2 (two) times a day ) 60 capsule 1    levothyroxine 100 mcg tablet Take 1 tablet (100 mcg total) by mouth daily 90 tablet 3    pitavastatin (Livalo) 2 mg Take 1 tablet (2 mg total) by mouth daily 90 tablet 3    sertraline (ZOLOFT) 100 mg tablet Take 1 tablet (100 mg total) by mouth daily 90 tablet 0    meloxicam (MOBIC) 15 mg tablet Take 1 tablet (15 mg total) by mouth daily (Patient not taking: Reported on 1/11/2021) 90 tablet 0     No current facility-administered medications for this visit         Allergies   Allergen Reactions    Methimazole        PAST HISTORY    Past Medical History:   Diagnosis Date    Anxiety     [facility]     Cardiac dysrhythmia     Cardiomyopathy [facility]     Cardiomyopathy [facility]     Depression     Hyperlipidemia     Hyperthyroidism     Hypothyroid     Palpitation     Post-nasal drip        Past Surgical History:   Procedure Laterality Date    FL INJECTION LEFT HIP (NON ARTHROGRAM)  2/26/2021  FL INJECTION LEFT HIP (NON ARTHROGRAM)  6/1/2021    JOINT REPLACEMENT Right     partial right knee replacement       Social History     Tobacco Use    Smoking status: Former Smoker    Smokeless tobacco: Never Used   Substance Use Topics    Alcohol use: Yes     Comment: occasional     Drug use: No       Family History   Problem Relation Age of Onset    Arthritis Mother     Heart failure Father     Cancer Father          Above history personally reviewed  EXAM    Vitals:Blood pressure 138/80, temperature 97 9 °F (36 6 °C), temperature source Temporal, height 5' 9" (1 753 m), weight 63 5 kg (140 lb)  ,Body mass index is 20 67 kg/m²  Physical Exam  Constitutional:       Appearance: Normal appearance  She is well-developed  HENT:      Head: Normocephalic and atraumatic  Eyes:      General: No scleral icterus  Extraocular Movements: EOM normal       Conjunctiva/sclera: Conjunctivae normal       Pupils: Pupils are equal, round, and reactive to light  Neck:      Vascular: No JVD  Trachea: No tracheal deviation  Cardiovascular:      Rate and Rhythm: Normal rate  Pulmonary:      Effort: Pulmonary effort is normal  No respiratory distress  Abdominal:      General: There is no distension  Palpations: Abdomen is soft  Musculoskeletal:         General: No tenderness or deformity  Normal range of motion  Cervical back: Normal range of motion and neck supple  Skin:     General: Skin is warm and dry  Neurological:      Mental Status: She is alert and oriented to person, place, and time  Cranial Nerves: No cranial nerve deficit  Motor: No weakness  Gait: Gait is intact  Deep Tendon Reflexes: Reflexes are normal and symmetric  Reflexes normal       Reflex Scores:       Patellar reflexes are 2+ on the right side and 2+ on the left side  Psychiatric:         Speech: Speech normal          Thought Content:  Thought content normal       Comments: Flat affect with minimal expressiveness  Neurologic Exam     Mental Status   Oriented to person, place, and time  Attention: normal    Speech: speech is normal   Level of consciousness: alert  Knowledge: good  Normal comprehension  Cranial Nerves     CN III, IV, VI   Pupils are equal, round, and reactive to light  Extraocular motions are normal    Upgaze: normal  Downgaze: normal    CN VII   Facial expression full, symmetric  CN VIII   CN VIII normal    Hearing: intact    Motor Exam   Muscle bulk: normal  Right arm tone: normal  Left arm tone: normal  Right leg tone: normal  Left leg tone: normal    Strength   Right deltoid: 5/5  Left deltoid: 5/5  Right biceps: 5/5  Left biceps: 5/5  Right triceps: 5/5  Left triceps: 5/5  Right wrist flexion: 5/5  Left wrist flexion: 5/5  Right wrist extension: 5/5  Left wrist extension: 5/5  Right iliopsoas: 5/5  Left iliopsoas: 5/5  Right quadriceps: 5/5  Left quadriceps: 5/5  Right hamstrin/5  Left hamstrin/5  Right anterior tibial: 5/5  Left anterior tibial: 5/5  Right gastroc: 5/5  Left gastroc: 5/5    Sensory Exam   Light touch normal    No subjective deficits to LT even distally to bilateral knees  Gait, Coordination, and Reflexes     Gait  Gait: normal    Tremor   Resting tremor: absent  Action tremor: absent    Reflexes   Right patellar: 2+  Left patellar: 2+  Right Casillas: absent  Left Casillas: absent  Right ankle clonus: absent  Left ankle clonus: absent        MEDICAL DECISION MAKING    Imaging Studies:     FL spine and pain procedure    Result Date: 2021  Narrative: Pre-procedure Diagnosis: 1  Lumbar radiculopathy  2  Lumbar stenosis with neurogenic claudication  Post-procedure Diagnosis: 1  Lumbar radiculopathy  2  Lumbar stenosis with neurogenic claudication  Procedure Title(s):  1  Left L3 transforaminal epidural steroid injection 2    Right L3 transforaminal epidural steroid injection Attending Surgeon:   Dianne Kumar MD Anesthesia: Local Indications: The patient is a 66y o  year-old female with a diagnosis of 1  Lumbar radiculopathy  2  Lumbar stenosis with neurogenic claudication   The patient's history and physical exam were reviewed  The risks, benefits and alternatives to the procedure were discussed, and all questions were answered to the patient's satisfaction  The patient agreed to proceed, and written informed consent was obtained  Procedure in Detail: The patient was brought into the procedure room and placed in the prone position on the fluoroscopy table  The area of the lumbar spine was prepped with chloraprep solution  then draped in a sterile manner  The L3 vertebral body was identified with AP fluoroscopy  An oblique view to the left was obtained to better visualize the inferior junction of the pedicle and transverse process  The 6 o'clock position of the pedicle was marked and identified  The skin and subcutaneous tissues in the area were anesthetized with 1% lidocaine  A 22-gauge, 3 5 inch needle was directed toward the targeted point under fluoroscopy until bone was contacted  The needle was then walked inferiorly until the neural foramen was entered  A lateral fluoroscopic view was then used to place the needle tip at the 10 o'clock position of the foramen  The same procedure was repeated for the right L3 level  Negative aspiration was confirmed, and 1 ml Omnipaque 300 was injected at each level  Appropriate neurograms were observed under AP fluoroscopy  Digital subtraction angiography was performed showing no vascular uptake and appropriate spread in the epidural space  Then, after negative aspiration, a solution consisting of 1 mL 0 25% bupivacaine and 0 5 mL depo-medrol (80mg/mL) was easily injected at each level  The needles were removed with a 1% lidocaine flush  The patient's back was cleaned and a bandage was placed over the needle insertion points   Disposition: The patient tolerated the procedure well, and there were no apparent complications  The patient was taken to the recovery area where written discharge instructions for the procedure were given  Estimated Blood Loss: None Specimens Obtained: N/A           I have personally reviewed pertinent reports     and I have personally reviewed pertinent films in PACS

## 2021-08-16 NOTE — ASSESSMENT & PLAN NOTE
· Presents for 2nd opinion for long standing LBP with radiculopathy    Imaging:   · MRI lumbar spine 6/15/2021: L3-4 degenerative disc disease with right greater than left canal stenosis and formaninal nerve impingement, moderate tricomparetment stenosis at L4-5 and left sided herniation with displacement of the left S1 nerve root  Plan:   · Continue to monitor neurological function  · Patient has completed multiple injections and PT within the last 6 months  · Follows with Dr Mary Lou Mora for pain management   · Previously was seen by Dr Denae Martinez who recommended multilevel decompression  · Patient at this time is without clear radicular symptoms or dermatomal pattern  Patient L>R leg pain distally from knee with burning sensations  · No motor deficits on exam  LT intact throughout  Bilaterally patellar reflexes intact  · Will plan for EMG bilaterally lower limbs  If consistent with radiculopathy, can consider surgical intervention  · Also placed order for bending lumbar x-rays to rule out instability that would require fusion  · Patient expressed understanding with plan although was not happy to not have a clear surgical plan at this time  · Will plan to follow up after completion of EMG and XR lumbar spine  Encouraged to call with questions or concerns

## 2021-08-16 NOTE — PATIENT INSTRUCTIONS
Lumbar Radiculopathy   WHAT YOU NEED TO KNOW:   Lumbar radiculopathy is a painful condition that happens when a nerve in your lumbar spine (lower back) is pinched or irritated  Nerves control feeling and movement in your body  You may have numbness or pain that shoots down from your lower back towards your foot  DISCHARGE INSTRUCTIONS:   Medicines:   · Medicines:     ? NSAIDs , such as ibuprofen, help decrease swelling, pain, and fever  This medicine is available with or without a doctor's order  NSAIDs can cause stomach bleeding or kidney problems in certain people  If you take blood thinner medicine, always ask your healthcare provider if NSAIDs are safe for you  Always read the medicine label and follow directions  ? Muscle relaxers  help decrease pain and muscle spasms  ? Opioids: This is a strong medicine given to reduce severe pain  It is also called narcotic pain medicine  Take this medicine exactly as directed by your healthcare provider  ? Oral steroids: Steroids may also be given to reduce pain and swelling  ? Take your medicine as directed  Contact your healthcare provider if you think your medicine is not helping or if you have side effects  Tell him of her if you are allergic to any medicine  Keep a list of the medicines, vitamins, and herbs you take  Include the amounts, and when and why you take them  Bring the list or the pill bottles to follow-up visits  Carry your medicine list with you in case of an emergency  Follow up with your healthcare provider or spine specialist within 1 to 3 weeks:  After your first follow-up appointment, return to your healthcare provider or spine specialist every 2 weeks until you have healed  Ask for information about physical therapy for your condition  Write down your questions so you remember to ask them during your visits  Physical therapy:  You may need physical therapy to improve your condition   Your physical therapist may teach you certain exercises to improve posture (the way you stand and sit), flexibility, and strength in your lower back  Self care:   · Stay active: It is best to be active when you have lumbar radiculopathy  Your physical therapist or healthcare provider may tell you to take walks to ease yourself back into your daily routine  Avoid long periods of bed rest  Bed rest could worsen your symptoms  Do not move in ways that increase your pain  Ask for more information about the best ways to stay active  · Use ice or heat packs:  Use ice or heat packs as directed on the sore area of your body to decrease the pain and swelling  Put ice in a plastic bag covered with a towel on your low back  Cover heated items with a towel to avoid burns  Use ice and heat as directed  · Avoid heavy lifting: Your condition may worsen if you lift heavy things  Avoid lifting if possible  · Maintain a healthy weight:  Excess body weight may strain your back  Talk with your healthcare provider about ways to lose excess weight if you are overweight  Contact your healthcare provider or spine specialist if:   · Your pain does not improve within 1 to 3 weeks after treatment  · Your pain and weakness keep you from your normal activities at work, home, or school  · You lose more than 10 pounds in 6 months without trying  · You become depressed or sad because of the pain  · You have questions or concerns about your condition or care  Return to the emergency department if:   · You have a fever greater than 100 4°F for longer than 2 days  · You have new, severe back or leg pain, or your pain spreads to both legs  · You have any new signs of numbness or weakness, especially in your lower back, legs, arms, or genital area  · You have new trouble controlling your urine and bowel movements  · You do not feel like your bladder empties when you urinate      © Copyright [employer] 2021 Information is for End User's use only and may not be sold, redistributed or otherwise used for commercial purposes  All illustrations and images included in CareNotes® are the copyrighted property of A D [employer] M [employer]  or Arianna Goodwin  The above information is an  only  It is not intended as medical advice for individual conditions or treatments  Talk to your doctor, nurse or pharmacist before following any medical regimen to see if it is safe and effective for you

## 2021-08-23 ENCOUNTER — TELEPHONE (OUTPATIENT)
Dept: CARDIOLOGY CLINIC | Facility: CLINIC | Age: 76
End: 2021-08-23

## 2021-08-23 NOTE — TELEPHONE ENCOUNTER
Patient is states her procedure scheduled on 9/2/21 was suppose to be cancelled, but its still showing. Please advise, flori    Call back# [phone]

## 2021-08-23 NOTE — TELEPHONE ENCOUNTER
Asking if she needed booster for COVID  Asked her  questions if cancer, organ transplant, autoimmune? No - then no booster at this time  She said but she's "old" and I said that doesn't qualify yet      Her  is receiving cancer trmts and should get the booster

## 2021-08-23 NOTE — TELEPHONE ENCOUNTER
Spoke to the patient and Dr Neil and since she is feeling better after her injection on 8/6 Tfesi for 9/2 has been cancelled

## 2021-08-31 ENCOUNTER — TELEPHONE (OUTPATIENT)
Dept: OBGYN CLINIC | Facility: HOSPITAL | Age: 76
End: 2021-08-31

## 2021-08-31 NOTE — TELEPHONE ENCOUNTER
Dr Sveta Bryant    Pt contacted Call Center requested refill of their medication  Medication Name: Gabapentin       Dosage of Med:300 mg      Frequency of Med: 1 Tablet 3 times daily  Remaining Medication: 6      Pharmacy and Location:   [facility]      Pt  Preferred Callback Phone Number:    [phone]  Thank you

## 2021-09-14 ENCOUNTER — HOSPITAL ENCOUNTER (OUTPATIENT)
Dept: RADIOLOGY | Facility: HOSPITAL | Age: 76
Discharge: HOME | End: 2021-09-14
Payer: MEDICARE

## 2021-09-14 DIAGNOSIS — M51.16 INTERVERTEBRAL DISC DISORDER WITH RADICULOPATHY OF LUMBAR REGION: ICD-10-CM

## 2021-09-14 PROCEDURE — 72120 X-RAY BEND ONLY L-S SPINE: CPT

## 2021-10-25 DIAGNOSIS — F41.9 ANXIETY: ICD-10-CM

## 2021-10-25 RX ORDER — SERTRALINE HYDROCHLORIDE 100 MG/1
100 TABLET, FILM COATED ORAL DAILY
Qty: 90 TABLET | Refills: 0 | Status: SHIPPED | OUTPATIENT
Start: 2021-10-25 | End: 2022-01-28

## 2021-11-01 ENCOUNTER — TELEPHONE (OUTPATIENT)
Dept: NEUROSURGERY | Facility: CLINIC | Age: 76
End: 2021-11-01

## 2021-11-01 ENCOUNTER — TELEPHONE (OUTPATIENT)
Dept: PAIN MEDICINE | Facility: CLINIC | Age: 76
End: 2021-11-01

## 2021-11-01 DIAGNOSIS — M48.061 SPINAL STENOSIS OF LUMBAR REGION, UNSPECIFIED WHETHER NEUROGENIC CLAUDICATION PRESENT: ICD-10-CM

## 2021-11-02 RX ORDER — GABAPENTIN 300 MG/1
300 CAPSULE ORAL 2 TIMES DAILY
Qty: 180 CAPSULE | Refills: 3 | Status: SHIPPED | OUTPATIENT
Start: 2021-11-02

## 2021-12-13 DIAGNOSIS — I10 ESSENTIAL (PRIMARY) HYPERTENSION: ICD-10-CM

## 2021-12-14 RX ORDER — CARVEDILOL 12.5 MG/1
12.5 TABLET ORAL 2 TIMES DAILY
Qty: 180 TABLET | Refills: 2 | Status: SHIPPED | OUTPATIENT
Start: 2021-12-14 | End: 2022-03-15 | Stop reason: SDUPTHER

## 2021-12-17 DIAGNOSIS — I10 ESSENTIAL (PRIMARY) HYPERTENSION: ICD-10-CM

## 2021-12-18 RX ORDER — LEVOTHYROXINE SODIUM 0.1 MG/1
100 TABLET ORAL DAILY
Qty: 90 TABLET | Refills: 3 | Status: SHIPPED | OUTPATIENT
Start: 2021-12-18 | End: 2022-01-24 | Stop reason: SDUPTHER

## 2022-01-19 ENCOUNTER — TELEPHONE (OUTPATIENT)
Dept: CARDIOLOGY CLINIC | Facility: CLINIC | Age: 77
End: 2022-01-19

## 2022-01-19 DIAGNOSIS — F41.9 ANXIETY: ICD-10-CM

## 2022-01-19 RX ORDER — ALPRAZOLAM 0.25 MG/1
0.25 TABLET ORAL 2 TIMES DAILY PRN
Qty: 60 TABLET | Refills: 5 | Status: SHIPPED | OUTPATIENT
Start: 2022-01-19 | End: 2022-01-24 | Stop reason: SDUPTHER

## 2022-01-24 ENCOUNTER — OFFICE VISIT (OUTPATIENT)
Dept: CARDIOLOGY CLINIC | Facility: CLINIC | Age: 77
End: 2022-01-24
Payer: MEDICARE

## 2022-01-24 VITALS
BODY MASS INDEX: 21.03 KG/M2 | OXYGEN SATURATION: 100 % | HEIGHT: 69 IN | SYSTOLIC BLOOD PRESSURE: 100 MMHG | DIASTOLIC BLOOD PRESSURE: 50 MMHG | HEART RATE: 51 BPM | WEIGHT: 142 LBS

## 2022-01-24 DIAGNOSIS — E78.2 MIXED HYPERLIPIDEMIA: ICD-10-CM

## 2022-01-24 DIAGNOSIS — F41.9 ANXIETY AND DEPRESSION: ICD-10-CM

## 2022-01-24 DIAGNOSIS — F32.A ANXIETY AND DEPRESSION: ICD-10-CM

## 2022-01-24 DIAGNOSIS — F41.9 ANXIETY: ICD-10-CM

## 2022-01-24 DIAGNOSIS — I42.9 CARDIOMYOPATHY, UNSPECIFIED TYPE (HCC): Primary | ICD-10-CM

## 2022-01-24 DIAGNOSIS — I48.0 PAROXYSMAL ATRIAL FIBRILLATION (HCC): ICD-10-CM

## 2022-01-24 DIAGNOSIS — I10 ESSENTIAL (PRIMARY) HYPERTENSION: ICD-10-CM

## 2022-01-24 PROCEDURE — 99214 OFFICE O/P EST MOD 30 MIN: CPT | Performed by: INTERNAL MEDICINE

## 2022-01-24 RX ORDER — EZETIMIBE 10 MG/1
10 TABLET ORAL DAILY
Qty: 90 TABLET | Refills: 3 | Status: SHIPPED | OUTPATIENT
Start: 2022-01-24 | End: 2022-03-15 | Stop reason: SDUPTHER

## 2022-01-24 RX ORDER — LEVOTHYROXINE SODIUM 0.1 MG/1
100 TABLET ORAL DAILY
Qty: 90 TABLET | Refills: 3 | Status: SHIPPED | OUTPATIENT
Start: 2022-01-24

## 2022-01-24 RX ORDER — ALPRAZOLAM 0.25 MG/1
0.25 TABLET ORAL 2 TIMES DAILY PRN
Qty: 60 TABLET | Refills: 5 | Status: SHIPPED | OUTPATIENT
Start: 2022-01-24 | End: 2022-08-09 | Stop reason: SDUPTHER

## 2022-01-24 NOTE — PROGRESS NOTES
Assessment/Plan:    Cardiomyopathy ([facility] )    Cardiomyopathy, stable  This has been stable with ejection fraction the vicinity of 60%  Paroxysmal atrial fibrillation (HCC)    History of paroxysmal atrial fibrillation, stable  The patient has minimal symptoms of palpitation and has been in regular rhythm  Mixed hyperlipidemia    Hyperlipidemia, stable  The patient will continue Livalo at 2 mg daily  Anxiety and depression    Anxiety and depression, not adequately controlled  The patient will continue Zoloft at 100 mg daily and consider further management options  Diagnoses and all orders for this visit:    Cardiomyopathy, unspecified type ([facility] )  -     CBC and differential; Future  -     Comprehensive metabolic panel; Future  -     TSH, 3rd generation  -     Lipid Panel with Direct LDL reflex; Future    Paroxysmal atrial fibrillation (HCC)    Mixed hyperlipidemia  -     ezetimibe (ZETIA) 10 mg tablet; Take 1 tablet (10 mg total) by mouth daily  -     CBC and differential; Future  -     Comprehensive metabolic panel; Future  -     TSH, 3rd generation  -     Lipid Panel with Direct LDL reflex; Future    Anxiety and depression    Essential (primary) hypertension  -     levothyroxine 100 mcg tablet; Take 1 tablet (100 mcg total) by mouth daily  -     CBC and differential; Future  -     Comprehensive metabolic panel; Future  -     TSH, 3rd generation  -     Lipid Panel with Direct LDL reflex; Future    Anxiety  -     ALPRAZolam (XANAX) 0 25 mg tablet; Take 1 tablet (0 25 mg total) by mouth 2 (two) times a day as needed for anxiety          Subjective:   Feels anxious  Some lightheadedness  Patient ID: Efra Phelps is a 64 y o  female  The patient presented to this office for the purpose of cardiac follow-up  She is known to have history cardiomyopathy well paroxysmal atrial fibrillation with hyperlipidemia  The patient depressed somewhat overwhelmed    Does have some lightheadedness upon rising mild symptoms of palpitation  She has experienced no symptoms of syncope or near-syncope  She experiences no leg edema  She has no symptoms chest pain      The following portions of the patient's history were reviewed and updated as appropriate: allergies, current medications, past family history, past medical history, past social history, past surgical history and problem list     Review of Systems   Respiratory: Negative for apnea, cough, chest tightness, shortness of breath and wheezing  Cardiovascular: Positive for palpitations (  Mild)  Negative for chest pain and leg swelling  Gastrointestinal: Negative for abdominal pain  Neurological: Positive for light-headedness  Negative for dizziness  Psychiatric/Behavioral: Negative  Objective: stable cardiac-wise  /50 (BP Location: Left arm, Patient Position: Sitting, Cuff Size: Adult)   Pulse (!) 51   Ht 5' 9" (1 753 m)   Wt 64 4 kg (142 lb)   SpO2 100%   BMI 20 97 kg/m²          Physical Exam  Vitals reviewed  Constitutional:       General: She is not in acute distress  Appearance: She is well-developed  She is not diaphoretic  HENT:      Head: Normocephalic and atraumatic  Neck:      Thyroid: No thyromegaly  Vascular: No JVD  Cardiovascular:      Rate and Rhythm: Normal rate and regular rhythm  Heart sounds: S1 normal and S2 normal  No murmur heard  No friction rub  No gallop  Pulmonary:      Effort: Pulmonary effort is normal  No respiratory distress  Breath sounds: No wheezing or rales  Chest:      Chest wall: No tenderness  Abdominal:      Palpations: Abdomen is soft  Musculoskeletal:      Cervical back: Normal range of motion and neck supple  Right lower leg: No edema  Left lower leg: No edema  Skin:     General: Skin is warm and dry  Neurological:      Mental Status: She is oriented to person, place, and time     Psychiatric:         Mood and Affect: Mood normal  Behavior: Behavior normal

## 2022-01-24 NOTE — LETTER
January 24, 2022     Jefe Cifuentes DO  [address] [facility]  [place] [address] [address]    Patient: Victorino Gonsales   YOB: 1945   Date of Visit: 1/24/2022       Dear Dr Gato Christian:    Thank you for referring Diana Holter to me for evaluation  Below are my notes for this consultation  If you have questions, please do not hesitate to call me  I look forward to following your patient along with you  Sincerely,        Antoinette Suazo MD        CC: No Recipients  Antoinette Suazo MD  1/24/2022 11:35 AM  Sign when Signing Visit  Assessment/Plan:    Cardiomyopathy ([facility] )    Cardiomyopathy, stable  This has been stable with ejection fraction the vicinity of 60%  Paroxysmal atrial fibrillation (HCC)    History of paroxysmal atrial fibrillation, stable  The patient has minimal symptoms of palpitation and has been in regular rhythm  Mixed hyperlipidemia    Hyperlipidemia, stable  The patient will continue Livalo at 2 mg daily  Anxiety and depression    Anxiety and depression, not adequately controlled  The patient will continue Zoloft at 100 mg daily and consider further management options  Diagnoses and all orders for this visit:    Cardiomyopathy, unspecified type ([facility] )  -     CBC and differential; Future  -     Comprehensive metabolic panel; Future  -     TSH, 3rd generation  -     Lipid Panel with Direct LDL reflex; Future    Paroxysmal atrial fibrillation (HCC)    Mixed hyperlipidemia  -     ezetimibe (ZETIA) 10 mg tablet; Take 1 tablet (10 mg total) by mouth daily  -     CBC and differential; Future  -     Comprehensive metabolic panel; Future  -     TSH, 3rd generation  -     Lipid Panel with Direct LDL reflex; Future    Anxiety and depression    Essential (primary) hypertension  -     levothyroxine 100 mcg tablet; Take 1 tablet (100 mcg total) by mouth daily  -     CBC and differential; Future  -     Comprehensive metabolic panel;  Future  -     TSH, 3rd generation  - Lipid Panel with Direct LDL reflex; Future    Anxiety  -     ALPRAZolam (XANAX) 0 25 mg tablet; Take 1 tablet (0 25 mg total) by mouth 2 (two) times a day as needed for anxiety          Subjective:   Feels anxious  Some lightheadedness  Patient ID: Akila Pa is a 64 y o  female  The patient presented to this office for the purpose of cardiac follow-up  She is known to have history cardiomyopathy well paroxysmal atrial fibrillation with hyperlipidemia  The patient depressed somewhat overwhelmed  Does have some lightheadedness upon rising mild symptoms of palpitation  She has experienced no symptoms of syncope or near-syncope  She experiences no leg edema  She has no symptoms chest pain      The following portions of the patient's history were reviewed and updated as appropriate: allergies, current medications, past family history, past medical history, past social history, past surgical history and problem list     Review of Systems   Respiratory: Negative for apnea, cough, chest tightness, shortness of breath and wheezing  Cardiovascular: Positive for palpitations (  Mild)  Negative for chest pain and leg swelling  Gastrointestinal: Negative for abdominal pain  Neurological: Positive for light-headedness  Negative for dizziness  Psychiatric/Behavioral: Negative  Objective: stable cardiac-wise  /50 (BP Location: Left arm, Patient Position: Sitting, Cuff Size: Adult)   Pulse (!) 51   Ht 5' 9" (1 753 m)   Wt 64 4 kg (142 lb)   SpO2 100%   BMI 20 97 kg/m²          Physical Exam  Vitals reviewed  Constitutional:       General: She is not in acute distress  Appearance: She is well-developed  She is not diaphoretic  HENT:      Head: Normocephalic and atraumatic  Neck:      Thyroid: No thyromegaly  Vascular: No JVD  Cardiovascular:      Rate and Rhythm: Normal rate and regular rhythm  Heart sounds: S1 normal and S2 normal  No murmur heard    No friction rub  No gallop  Pulmonary:      Effort: Pulmonary effort is normal  No respiratory distress  Breath sounds: No wheezing or rales  Chest:      Chest wall: No tenderness  Abdominal:      Palpations: Abdomen is soft  Musculoskeletal:      Cervical back: Normal range of motion and neck supple  Right lower leg: No edema  Left lower leg: No edema  Skin:     General: Skin is warm and dry  Neurological:      Mental Status: She is oriented to person, place, and time     Psychiatric:         Mood and Affect: Mood normal          Behavior: Behavior normal

## 2022-01-24 NOTE — ASSESSMENT & PLAN NOTE
Anxiety and depression, not adequately controlled  The patient will continue Zoloft at 100 mg daily and consider further management options

## 2022-01-24 NOTE — ASSESSMENT & PLAN NOTE
History of paroxysmal atrial fibrillation, stable  The patient has minimal symptoms of palpitation and has been in regular rhythm

## 2022-01-28 DIAGNOSIS — F41.9 ANXIETY: ICD-10-CM

## 2022-01-29 RX ORDER — SERTRALINE HYDROCHLORIDE 100 MG/1
100 TABLET, FILM COATED ORAL DAILY
Qty: 90 TABLET | Refills: 2 | Status: SHIPPED | OUTPATIENT
Start: 2022-01-29 | End: 2022-03-15 | Stop reason: SDUPTHER

## 2022-02-16 ENCOUNTER — HOSPITAL ENCOUNTER (OUTPATIENT)
Dept: NEUROLOGY | Facility: CLINIC | Age: 77
Discharge: HOME | End: 2022-02-16
Payer: MEDICARE

## 2022-02-16 DIAGNOSIS — M51.16 INTERVERTEBRAL DISC DISORDER WITH RADICULOPATHY OF LUMBAR REGION: ICD-10-CM

## 2022-02-16 PROCEDURE — 95911 NRV CNDJ TEST 9-10 STUDIES: CPT | Performed by: PSYCHIATRY & NEUROLOGY

## 2022-02-16 PROCEDURE — 95886 MUSC TEST DONE W/N TEST COMP: CPT | Performed by: PSYCHIATRY & NEUROLOGY

## 2022-02-28 ENCOUNTER — OFFICE VISIT (OUTPATIENT)
Dept: NEUROSURGERY | Facility: CLINIC | Age: 77
End: 2022-02-28
Payer: MEDICARE

## 2022-02-28 VITALS
TEMPERATURE: 97.4 F | SYSTOLIC BLOOD PRESSURE: 120 MMHG | HEIGHT: 69 IN | WEIGHT: 147 LBS | DIASTOLIC BLOOD PRESSURE: 80 MMHG | BODY MASS INDEX: 21.77 KG/M2

## 2022-02-28 DIAGNOSIS — M51.16 INTERVERTEBRAL DISC DISORDER WITH RADICULOPATHY OF LUMBAR REGION: Primary | ICD-10-CM

## 2022-02-28 PROCEDURE — 99215 OFFICE O/P EST HI 40 MIN: CPT | Performed by: NEUROLOGICAL SURGERY

## 2022-02-28 NOTE — PROGRESS NOTES
Neurosurgery Office Note  Paula Anderson 64 y o  female MRN: [id number]      Assessment/Plan     Intervertebral disc disorder with radiculopathy of lumbar region  · Presents today for further evaluation and workup of low back pain with radiculopathy    Imaging reviewed with Dr Crocker:   · MRI lumbar spine 6/15/2021: L3-4 degenerative disc disease with right greater than left canal stenosis and formaninal nerve impingement, moderate tricomparetment stenosis at L4-5 and left sided herniation with displacement of the left S1 nerve root  · Lumbar flexion/extension x-rays 9/14/21:No subluxation with flexion/extension  · EMG B/L LEs 2/16/22: Findings consistent with chronic lumbar radiculopathy in both lower extremities, affecting the L4-5 myotomes on the left, and L4 myotome on the right  Plan:   · Continue to monitor neurological function  · Reviewed imaging and studies with patient  · Patient has completed multiple injections and PT within the last year  · Follows with Dr Annamarie Marcos for pain management  Can possibly try IMELDA of L4-5 level   · Previously was seen by Dr Nelson Houser who recommended multilevel decompression  · Patient at this time is without clear radicular symptoms or dermatomal pattern  Patient L>R leg pain distally from knee with burning sensations  · No motor deficits on exam  LT intact throughout  Bilaterally patellar reflexes intact  · Surgery was discussed in detail with patient by Dr Crocker as well as conservative management  · Patient has decided to continue with conservative management at this time  Patient declined referral to physical therapy and pain management and will follow-up with Dr Neil  · Patient will follow-up in 3 months for clinical follow-up or sooner if symptoms worsen  · Patient made aware to seek care sooner if she develops any new or worsening neurological changes or red flag signs      · Patient made aware to contact Neurosurgery with any further questions or concerns       There are no diagnoses linked to this encounter  CHIEF COMPLAINT    Chief Complaint   Patient presents with    Follow-up       HISTORY    History of Present Illness     64y o  year old female with past medical history significant for cardiomyopathy, AFib, osteoarthritis, hyperlipidemia, anxiety, and depression  Patient presents today for further evaluation and workup of low back pain with bilateral lower extremity radiculopathy  Patient initially was seen in consult in August 2021 as a consultation for 2nd opinion of her longstanding low back pain with lower extremity radiculopathy  Per chart review patient originally orthopedic surgery and recommended she undergo a multilevel lumbar decompression surgery  Patient states she has had long standing history of bilateral lower extremity difficulties primary left greater than right  She states she has had low back with lower extremity radiculopathy for years with no precipitating events or traumas  Patient currently complaining of 2-3/10 low back pain which she reports as an ache which comes and goes  She reports her bilateral lower extremity pain is constant  She states it starts in her mid anterior thigh and radiates into the anterior aspect of her shin with associated numbness in her feet  Patient complains of a lot of left knee pain  Patient feels as though her left side is worse than her right side  She also endorses a rubber-band pulling her toes downward in bilateral feet  Patient states vacuuming, sitting for too long or bending will worsen her pain  Patient states using a heating pad helps with her pain she denies taking any medications  She denies any recent falls or traumas or difficulty with her balance  She denies using any assistive devices  Patient reports from her knees down her legs just do not feel right  Patient also endorses bilateral lower extremity weakness    She also endorses occasional headaches as well as panic attacks  She denies any dizziness, blurry vision, chest pain, shortness of breath, abdominal pain, nausea, vomiting, diarrhea, no problems bowel or bladder, no new weakness or numbness/tingling  Patient reports her pain and symptoms are impacting her quality of life  Patient states she did see physical therapy about a year ago which did help with her pain or symptoms  Patient states she was following with pain management and receive IMELDA injections which did not last long  Per chart review patient received her last IMELDA in August 2021  Patient states her last injection lasted about 2 months  HPI    See Discussion    REVIEW OF SYSTEMS    Review of Systems   Constitutional: Negative  HENT: Negative  Eyes: Negative  Respiratory: Negative  Cardiovascular: Negative  Gastrointestinal: Negative  Endocrine: Negative  Genitourinary: Negative  Musculoskeletal: Positive for arthralgias (B/L legs), back pain (low back pain) and gait problem (stiffness)  Skin: Negative  Allergic/Immunologic: Negative  Neurological: Positive for weakness (b/l legs worse in the left side ) and numbness (b/l legs worse in the left side )  Hematological: Negative  Psychiatric/Behavioral: Negative        ROS reviewed with patient and agree and changes were made as needed    Meds/Allergies     Current Outpatient Medications   Medication Sig Dispense Refill    ALPRAZolam (XANAX) 0 25 mg tablet Take 1 tablet (0 25 mg total) by mouth 2 (two) times a day as needed for anxiety 60 tablet 5    carvedilol (COREG) 12 5 mg tablet Take 1 tablet (12 5 mg total) by mouth 2 (two) times a day 180 tablet 2    cholecalciferol (VITAMIN D3) 1,000 units tablet Take by mouth      ezetimibe (ZETIA) 10 mg tablet Take 1 tablet (10 mg total) by mouth daily 90 tablet 3    gabapentin (NEURONTIN) 300 mg capsule Take 1 capsule (300 mg total) by mouth 2 (two) times a day 180 capsule 3    levothyroxine 100 mcg tablet Take 1 tablet (100 mcg total) by mouth daily 90 tablet 3    pitavastatin (Livalo) 2 mg Take 1 tablet (2 mg total) by mouth daily 90 tablet 3    sertraline (ZOLOFT) 100 mg tablet Take 1 tablet (100 mg total) by mouth daily 90 tablet 2    meloxicam (MOBIC) 15 mg tablet Take 1 tablet (15 mg total) by mouth daily (Patient not taking: Reported on 1/11/2021) 90 tablet 0     No current facility-administered medications for this visit  Allergies   Allergen Reactions    Methimazole        PAST HISTORY    Past Medical History:   Diagnosis Date    Anxiety     [facility]     Cardiac dysrhythmia     [facility]     [facility]     Depression     Hyperlipidemia     Hyperthyroidism     Hypothyroid     Palpitation     Post-nasal drip        Past Surgical History:   Procedure Laterality Date    FL INJECTION LEFT HIP (NON ARTHROGRAM)  2/26/2021    FL INJECTION LEFT HIP (NON ARTHROGRAM)  6/1/2021    JOINT REPLACEMENT Right     partial right knee replacement       Social History     Tobacco Use    Smoking status: Former Smoker    Smokeless tobacco: Never Used   Substance Use Topics    Alcohol use: Yes     Comment: occasional     Drug use: No       Family History   Problem Relation Age of Onset    Arthritis Mother     Heart failure Father     Cancer Father          Above history personally reviewed  EXAM    Vitals:Blood pressure 120/80, temperature (!) 97 4 °F (36 3 °C), temperature source Temporal, height 5' 9" (1 753 m), weight 66 7 kg (147 lb)  ,Body mass index is 21 71 kg/m²  Physical Exam  Vitals reviewed  Exam conducted with a chaperone present (Patient accompanied by her )  Constitutional:       General: She is awake  She is not in acute distress  Appearance: Normal appearance  She is not ill-appearing  HENT:      Head: Normocephalic and atraumatic     Eyes:      Extraocular Movements: Extraocular movements intact and EOM normal  Conjunctiva/sclera: Conjunctivae normal       Pupils: Pupils are equal, round, and reactive to light  Cardiovascular:      Rate and Rhythm: Normal rate  Pulmonary:      Effort: Pulmonary effort is normal  No respiratory distress  Chest:      Chest wall: No tenderness  Abdominal:      General: There is no distension  Palpations: Abdomen is soft  Tenderness: There is no abdominal tenderness  Musculoskeletal:         General: Normal range of motion  Cervical back: Normal range of motion and neck supple  No tenderness  No spinous process tenderness or muscular tenderness  Thoracic back: No tenderness  Lumbar back: No tenderness  Skin:     General: Skin is warm and dry  Neurological:      Mental Status: She is alert and oriented to person, place, and time  Coordination: Finger-Nose-Finger Test normal       Gait: Gait is intact  Deep Tendon Reflexes: Strength normal       Reflex Scores:       Bicep reflexes are 2+ on the right side and 2+ on the left side  Patellar reflexes are 2+ on the right side and 2+ on the left side  Psychiatric:         Attention and Perception: Attention and perception normal          Mood and Affect: Mood and affect normal          Speech: Speech normal          Behavior: Behavior normal  Behavior is cooperative  Thought Content: Thought content normal          Cognition and Memory: Cognition and memory normal          Judgment: Judgment normal          Neurologic Exam     Mental Status   Oriented to person, place, and time  Follows 2 step commands  Attention: normal  Concentration: normal    Speech: speech is normal   Level of consciousness: alert  Knowledge: good  Able to perform simple calculations  Able to name object  Able to repeat  Normal comprehension  Cranial Nerves     CN III, IV, VI   Pupils are equal, round, and reactive to light    Extraocular motions are normal    CN III: no CN III palsy  CN VI: no CN VI palsy  Nystagmus: none   Diplopia: none  Conjugate gaze: present    CN V   Facial sensation intact  CN VII   Facial expression full, symmetric  CN VIII   CN VIII normal    Hearing: intact    CN IX, X   CN IX normal      CN XI   CN XI normal      CN XII   CN XII normal      Motor Exam   Muscle bulk: normal  Overall muscle tone: normal  Right arm pronator drift: absent  Left arm pronator drift: absent    Strength   Strength 5/5 throughout  Sensory Exam   Light touch normal    Proprioception normal    JPS and DST intact     Gait, Coordination, and Reflexes     Gait  Gait: normal    Coordination   Finger to nose coordination: normal    Tremor   Resting tremor: absent  Intention tremor: absent  Action tremor: absent    Reflexes   Right biceps: 2+  Left biceps: 2+  Right patellar: 2+  Left patellar: 2+  Right Casillas: absent  Left Casillas: absent  Right ankle clonus: absent  Left ankle clonus: absent        MEDICAL DECISION MAKING    Imaging Studies:     No results found  I have personally reviewed pertinent reports     and I have personally reviewed pertinent films in PACS

## 2022-02-28 NOTE — ASSESSMENT & PLAN NOTE
· Presents today for further evaluation and workup of low back pain with radiculopathy    Imaging reviewed with Dr Crocker:   · MRI lumbar spine 6/15/2021: L3-4 degenerative disc disease with right greater than left canal stenosis and formaninal nerve impingement, moderate tricomparetment stenosis at L4-5 and left sided herniation with displacement of the left S1 nerve root  · Lumbar flexion/extension x-rays 9/14/21:No subluxation with flexion/extension  · EMG B/L LEs 2/16/22: Findings consistent with chronic lumbar radiculopathy in both lower extremities, affecting the L4-5 myotomes on the left, and L4 myotome on the right  Plan:   · Continue to monitor neurological function  · Reviewed imaging and studies with patient  · Patient has completed multiple injections and PT within the last year  · Follows with Dr Meredith Parent for pain management  Can possibly try IMELDA of L4-5 level   · Previously was seen by Dr Lady Celeste who recommended multilevel decompression  · Patient at this time is without clear radicular symptoms or dermatomal pattern  Patient L>R leg pain distally from knee with burning sensations  · No motor deficits on exam  LT intact throughout  Bilaterally patellar reflexes intact  · Surgery was discussed in detail with patient by Dr Crocker as well as conservative management  · Patient has decided to continue with conservative management at this time  Patient declined referral to physical therapy and pain management and will follow-up with Dr Neil  · Patient will follow-up in 3 months for clinical follow-up or sooner if symptoms worsen  · Patient made aware to seek care sooner if she develops any new or worsening neurological changes or red flag signs      · Patient made aware to contact Neurosurgery with any further questions or concerns

## 2022-03-15 DIAGNOSIS — E78.2 MIXED HYPERLIPIDEMIA: ICD-10-CM

## 2022-03-15 DIAGNOSIS — F41.9 ANXIETY: ICD-10-CM

## 2022-03-15 DIAGNOSIS — I10 ESSENTIAL (PRIMARY) HYPERTENSION: ICD-10-CM

## 2022-03-15 RX ORDER — CARVEDILOL 12.5 MG/1
12.5 TABLET ORAL 2 TIMES DAILY
Qty: 180 TABLET | Refills: 2 | Status: SHIPPED | OUTPATIENT
Start: 2022-03-15 | End: 2022-08-09 | Stop reason: SDUPTHER

## 2022-03-15 RX ORDER — EZETIMIBE 10 MG/1
10 TABLET ORAL DAILY
Qty: 90 TABLET | Refills: 3 | Status: SHIPPED | OUTPATIENT
Start: 2022-03-15

## 2022-03-15 RX ORDER — SERTRALINE HYDROCHLORIDE 100 MG/1
100 TABLET, FILM COATED ORAL DAILY
Qty: 90 TABLET | Refills: 2 | Status: SHIPPED | OUTPATIENT
Start: 2022-03-15 | End: 2022-08-09 | Stop reason: SDUPTHER

## 2022-03-21 DIAGNOSIS — E78.2 MIXED HYPERLIPIDEMIA: ICD-10-CM

## 2022-03-29 ENCOUNTER — APPOINTMENT (OUTPATIENT)
Dept: LAB | Facility: CLINIC | Age: 77
End: 2022-03-29
Payer: MEDICARE

## 2022-03-29 DIAGNOSIS — I10 ESSENTIAL (PRIMARY) HYPERTENSION: ICD-10-CM

## 2022-03-29 DIAGNOSIS — I42.9 CARDIOMYOPATHY, UNSPECIFIED TYPE (HCC): ICD-10-CM

## 2022-03-29 DIAGNOSIS — E78.2 MIXED HYPERLIPIDEMIA: ICD-10-CM

## 2022-03-29 LAB
ALBUMIN SERPL BCP-MCNC: 4 G/DL (ref 3.5–5)
ALP SERPL-CCNC: 78 U/L (ref 46–116)
ALT SERPL W P-5'-P-CCNC: 18 U/L (ref 12–78)
ANION GAP SERPL CALCULATED.3IONS-SCNC: 1 MMOL/L (ref 4–13)
AST SERPL W P-5'-P-CCNC: 24 U/L (ref 5–45)
BASOPHILS # BLD AUTO: 0.05 THOUSANDS/ΜL (ref 0–0.1)
BASOPHILS NFR BLD AUTO: 1 % (ref 0–1)
BILIRUB SERPL-MCNC: 0.52 MG/DL (ref 0.2–1)
BUN SERPL-MCNC: 13 MG/DL (ref 5–25)
CALCIUM SERPL-MCNC: 9.5 MG/DL (ref 8.3–10.1)
CHLORIDE SERPL-SCNC: 107 MMOL/L (ref 100–108)
CHOLEST SERPL-MCNC: 170 MG/DL
CO2 SERPL-SCNC: 31 MMOL/L (ref 21–32)
CREAT SERPL-MCNC: 1.04 MG/DL (ref 0.6–1.3)
EOSINOPHIL # BLD AUTO: 0.17 THOUSAND/ΜL (ref 0–0.61)
EOSINOPHIL NFR BLD AUTO: 3 % (ref 0–6)
ERYTHROCYTE [DISTWIDTH] IN BLOOD BY AUTOMATED COUNT: 12.9 % (ref 11.6–15.1)
GFR SERPL CREATININE-BSD FRML MDRD: 51 ML/MIN/1.73SQ M
GLUCOSE P FAST SERPL-MCNC: 104 MG/DL (ref 65–99)
HCT VFR BLD AUTO: 41.3 % (ref 34.8–46.1)
HDLC SERPL-MCNC: 58 MG/DL
HGB BLD-MCNC: 12.7 G/DL (ref 11.5–15.4)
IMM GRANULOCYTES # BLD AUTO: 0.01 THOUSAND/UL (ref 0–0.2)
IMM GRANULOCYTES NFR BLD AUTO: 0 % (ref 0–2)
LDLC SERPL CALC-MCNC: 91 MG/DL (ref 0–100)
LYMPHOCYTES # BLD AUTO: 1.12 THOUSANDS/ΜL (ref 0.6–4.47)
LYMPHOCYTES NFR BLD AUTO: 20 % (ref 14–44)
MCH RBC QN AUTO: 29.5 PG (ref 26.8–34.3)
MCHC RBC AUTO-ENTMCNC: 30.8 G/DL (ref 31.4–37.4)
MCV RBC AUTO: 96 FL (ref 82–98)
MONOCYTES # BLD AUTO: 0.41 THOUSAND/ΜL (ref 0.17–1.22)
MONOCYTES NFR BLD AUTO: 7 % (ref 4–12)
NEUTROPHILS # BLD AUTO: 3.77 THOUSANDS/ΜL (ref 1.85–7.62)
NEUTS SEG NFR BLD AUTO: 69 % (ref 43–75)
NRBC BLD AUTO-RTO: 0 /100 WBCS
PLATELET # BLD AUTO: 152 THOUSANDS/UL (ref 149–390)
PMV BLD AUTO: 11.3 FL (ref 8.9–12.7)
POTASSIUM SERPL-SCNC: 4.2 MMOL/L (ref 3.5–5.3)
PROT SERPL-MCNC: 7.1 G/DL (ref 6.4–8.2)
RBC # BLD AUTO: 4.3 MILLION/UL (ref 3.81–5.12)
SODIUM SERPL-SCNC: 139 MMOL/L (ref 136–145)
TRIGL SERPL-MCNC: 105 MG/DL
TSH SERPL DL<=0.05 MIU/L-ACNC: 1.2 UIU/ML (ref 0.36–3.74)
WBC # BLD AUTO: 5.53 THOUSAND/UL (ref 4.31–10.16)

## 2022-03-29 PROCEDURE — 85025 COMPLETE CBC W/AUTO DIFF WBC: CPT

## 2022-03-29 PROCEDURE — 84443 ASSAY THYROID STIM HORMONE: CPT | Performed by: INTERNAL MEDICINE

## 2022-03-29 PROCEDURE — 80061 LIPID PANEL: CPT

## 2022-03-29 PROCEDURE — 80053 COMPREHEN METABOLIC PANEL: CPT

## 2022-03-29 PROCEDURE — 36415 COLL VENOUS BLD VENIPUNCTURE: CPT

## 2022-04-12 DIAGNOSIS — E78.2 MIXED HYPERLIPIDEMIA: ICD-10-CM

## 2022-08-01 ENCOUNTER — OFFICE VISIT (OUTPATIENT)
Dept: NEUROSURGERY | Facility: CLINIC | Age: 77
End: 2022-08-01
Payer: MEDICARE

## 2022-08-01 VITALS
HEART RATE: 80 BPM | WEIGHT: 142 LBS | SYSTOLIC BLOOD PRESSURE: 110 MMHG | HEIGHT: 69 IN | DIASTOLIC BLOOD PRESSURE: 60 MMHG | RESPIRATION RATE: 16 BRPM | BODY MASS INDEX: 21.03 KG/M2 | TEMPERATURE: 99 F

## 2022-08-01 DIAGNOSIS — M48.062 LUMBAR STENOSIS WITH NEUROGENIC CLAUDICATION: Primary | ICD-10-CM

## 2022-08-01 PROCEDURE — 99213 OFFICE O/P EST LOW 20 MIN: CPT | Performed by: PHYSICIAN ASSISTANT

## 2022-08-01 NOTE — PROGRESS NOTES
Neurosurgery Office Note  Pamela Roberts 64 y o  female MRN: [id number]      Assessment/Plan     Lumbar stenosis with neurogenic claudication  · Ongoing evaluation of low back pain with radiculopathy    Imaging from prior:   · MRI lumbar spine 6/15/2021: L3-4 degenerative disc disease with right greater than left canal stenosis and formaninal nerve impingement, moderate tricomparetment stenosis at L4-5 and left sided herniation with displacement of the left S1 nerve root  · Lumbar flexion/extension x-rays 9/14/21:No subluxation with flexion/extension  · EMG B/L LEs 2/16/22: Findings consistent with chronic lumbar radiculopathy in both lower extremities, affecting the L4-5 myotomes on the left, and L4 myotome on the right  Plan:   · Continue to monitor neurological function  · No recent imaging to review with patient  · Patient has not completed any further conservative measures since she was previously seen  · Previously followed with Dr Mary Vicente for pain management  Would still recommend consideration of IMELDA at L4-5 level   · Again reviewed surgical discussion including L3-4 and L4-5 decompression with facetectomy and fixation fusion 2/2 anatomy  · No motor deficits on exam  LT intact throughout  · Patient undergoing personal challenges at the moment that would make undergoing surgery very difficult- lack of family support and being primary caretaker for   · Recommend follow up with pain management for injections  Patient made aware to contact Neurosurgery with any further questions or concerns  · Follow up as needed at this time unless patient would like to proceed with surgery  There are no diagnoses linked to this encounter  I spent 25 minutes with the patient today in which >50% of the time was spent counseling/coordination of care regarding diagnosis, imaging review, symptoms and treatment plan       CHIEF COMPLAINT    Chief Complaint   Patient presents with   [facility] Follow-up       HISTORY    History of Present Illness     64y o  year old female who presents to the outpatient neurosurgical office low back pain with lower extremity radiculopathy  Patient has a past medical history including cardiomyopathy, AFib left hip arthritis, hyperlipidemia, anxiety depression  Patient's  previously had surgery with Dr Stephen Marquez  Patient was originally seen by Orthopedic surgery and recommended undergo a multilevel lumbar decompression surgery  Patient states she has a longstanding history of bilateral lower extremity difficulties primarily left greater than right side  Patient relates her pain and discomfort is primarily from her buttock radiating down the left leg in a non-dermatomal fashion associated with a burning sensation and electrical shocks from the knee down  She states she has trouble walking, but does not directly relay symptoms of neurogenic claudication  She states it just hurts She is unable to state whether she has claudication symptoms when asked about her ambulatory ability  She states that everything she does causes her legs to hurt more  She states sitting down to rest does not necessarily improve her symptoms  She does endorse some back pain, but this is not a large hindrance to her day to day activity like her legs are  Patient has completed physical therapy and been undergoing injections with Dr Luz Maria Felipe  Most recent injection was last august without any new conservative measures since that time  Prior injections gave about 2 months of improvement which is very modest result  She has no bowel or bladder dysfunction  She has no myelopathy or long tract signs on exam        See Discussion    REVIEW OF SYSTEMS    Review of Systems   Constitutional: Negative  HENT: Negative  Eyes: Negative  Respiratory: Negative  Cardiovascular: Negative  Gastrointestinal: Negative  Endocrine: Negative  Genitourinary: Negative      Musculoskeletal: Positive for arthralgias (B/L legs), back pain (low back pain, bi/legs left is worse) and gait problem (stiffness)  Skin: Negative  Allergic/Immunologic: Negative  Neurological: Positive for weakness (b/l legs worse in the left side ) and numbness (b/l legs worse in the left side )  Hematological: Negative  Psychiatric/Behavioral: Negative  Meds/Allergies     Current Outpatient Medications   Medication Sig Dispense Refill    ALPRAZolam (XANAX) 0 25 mg tablet Take 1 tablet (0 25 mg total) by mouth 2 (two) times a day as needed for anxiety 60 tablet 5    carvedilol (COREG) 12 5 mg tablet Take 1 tablet (12 5 mg total) by mouth 2 (two) times a day 180 tablet 2    cholecalciferol (VITAMIN D3) 1,000 units tablet Take by mouth      ezetimibe (ZETIA) 10 mg tablet Take 1 tablet (10 mg total) by mouth daily 90 tablet 3    gabapentin (NEURONTIN) 300 mg capsule Take 1 capsule (300 mg total) by mouth 2 (two) times a day 180 capsule 3    levothyroxine 100 mcg tablet Take 1 tablet (100 mcg total) by mouth daily 90 tablet 3    meloxicam (MOBIC) 15 mg tablet Take 1 tablet (15 mg total) by mouth daily 90 tablet 0    pitavastatin (Livalo) 2 mg Take 1 tablet (2 mg total) by mouth daily 90 tablet 3    sertraline (ZOLOFT) 100 mg tablet Take 1 tablet (100 mg total) by mouth daily 90 tablet 2     No current facility-administered medications for this visit         Allergies   Allergen Reactions    Methimazole        PAST HISTORY    Past Medical History:   Diagnosis Date    Anxiety     [facility]     Cardiac dysrhythmia     [facility]omy[facility] )     [facility]     Depression     Hyperlipidemia     Hyperthyroidism     Hypothyroid     Palpitation     Post-nasal drip        Past Surgical History:   Procedure Laterality Date    FL INJECTION LEFT HIP (NON ARTHROGRAM)  2/26/2021    FL INJECTION LEFT HIP (NON ARTHROGRAM)  6/1/2021    JOINT REPLACEMENT Right     partial right knee replacement       Social History     Tobacco Use    Smoking status: Former Smoker    Smokeless tobacco: Never Used   Substance Use Topics    Alcohol use: Yes     Comment: occasional     Drug use: No       Family History   Problem Relation Age of Onset    Arthritis Mother     Heart failure Father     Cancer Father          Above history personally reviewed  EXAM    Vitals:Blood pressure 110/60, pulse 80, temperature 99 °F (37 2 °C), temperature source Tympanic, resp  rate 16, height 5' 9" (1 753 m), weight 64 4 kg (142 lb)  ,Body mass index is 20 97 kg/m²  Physical Exam  Constitutional:       Appearance: Normal appearance  She is well-developed  HENT:      Head: Normocephalic and atraumatic  Eyes:      Extraocular Movements: EOM normal    Neck:      Vascular: No JVD  Trachea: No tracheal deviation  Cardiovascular:      Rate and Rhythm: Normal rate  Pulmonary:      Effort: Pulmonary effort is normal    Musculoskeletal:         General: No tenderness or deformity  Normal range of motion  Cervical back: Normal range of motion and neck supple  Skin:     General: Skin is warm and dry  Neurological:      Mental Status: She is alert and oriented to person, place, and time  Cranial Nerves: No cranial nerve deficit  Sensory: No sensory deficit  Motor: No weakness  Deep Tendon Reflexes:      Reflex Scores:       Patellar reflexes are 1+ on the right side and 1+ on the left side  Psychiatric:         Speech: Speech normal          Behavior: Behavior normal          Thought Content: Thought content normal          Neurologic Exam     Mental Status   Oriented to person, place, and time  Attention: normal    Speech: speech is normal   Level of consciousness: alert  Knowledge: good  Normal comprehension  Cranial Nerves     CN III, IV, VI   Extraocular motions are normal    Upgaze: normal  Downgaze: normal    CN VII   Facial expression full, symmetric       CN VIII CN VIII normal    Hearing: intact    Motor Exam   Muscle bulk: normal  Right arm tone: normal  Left arm tone: normal  Right leg tone: normal  Left leg tone: normal    Strength   Right deltoid: 5/5  Left deltoid: 5/5  Right biceps: 5/5  Left biceps: 5/5  Right triceps: 5/5  Left triceps: 5/5  Right wrist flexion: 5/5  Left wrist flexion: 5/5  Right wrist extension: 5/5  Left wrist extension: 5/5  Right iliopsoas: 5/5  Left iliopsoas: 5/5  Right quadriceps: 5/5  Left quadriceps: 5/5  Right hamstrin/5  Left hamstrin/5  Right anterior tibial: 5/5  Left anterior tibial: 5/5  Right gastroc: 5/5  Left gastroc: 5/5    Sensory Exam   Light touch normal      Gait, Coordination, and Reflexes     Gait  Gait: (normal but slowed )    Tremor   Resting tremor: absent  Action tremor: absent    Reflexes   Right patellar: 1+  Left patellar: 1+  Right ankle clonus: absent  Left ankle clonus: absent        MEDICAL DECISION MAKING    Imaging Studies:     No results found  I have personally reviewed pertinent reports     and I have personally reviewed pertinent films in PACS

## 2022-08-02 ENCOUNTER — TELEPHONE (OUTPATIENT)
Dept: PAIN MEDICINE | Facility: MEDICAL CENTER | Age: 77
End: 2022-08-02

## 2022-08-02 DIAGNOSIS — M51.16 INTERVERTEBRAL DISC DISORDER WITH RADICULOPATHY OF LUMBAR REGION: Primary | ICD-10-CM

## 2022-08-02 NOTE — TELEPHONE ENCOUNTER
RN s/w pt about her request to schedule an injection.  Pt offered an ov w/ Tyra to be re-eval since her last inj was 8/2021.  Pt said she s/w FQ last Thurs 7/26 while her  Robin was there having his injection and FQ told pt she would not need an ov and he would put an order in for an injection and have someone call her to schedule it. Pt said no one has called yet.    RN reviewed Epic and FQ never placed the order for pt's inj. RN apologized to pt and told her I would explain situation to his CRNP Tyra and ask her to place the order.   Pt confirmed she has pain ob B/L LB, Lt worse then Rt and pain goes down both legs, Lt worse than Rt.  Pt said she saw Dr. Crocker yest and he confirmed she should get an inj.   Told pt once order placed by Tyra our  will call her, I also provided pt with 's ph #.    Tyra, pls review Dr. Crocker's ov note from 8/1/22, he mentions IMELDA @ L4-L5. Pt's last inj was 8/6/21 for B/L L3 TFESI.

## 2022-08-02 NOTE — TELEPHONE ENCOUNTER
Patient calling stating pain level 4/10 and would like to schedule procedure.    Please advise     Patient can be reached at [phone]. QL

## 2022-08-09 ENCOUNTER — OFFICE VISIT (OUTPATIENT)
Dept: CARDIOLOGY CLINIC | Facility: CLINIC | Age: 77
End: 2022-08-09
Payer: MEDICARE

## 2022-08-09 VITALS
HEART RATE: 55 BPM | OXYGEN SATURATION: 97 % | SYSTOLIC BLOOD PRESSURE: 118 MMHG | BODY MASS INDEX: 20.73 KG/M2 | WEIGHT: 140 LBS | DIASTOLIC BLOOD PRESSURE: 72 MMHG | HEIGHT: 69 IN

## 2022-08-09 DIAGNOSIS — I10 ESSENTIAL (PRIMARY) HYPERTENSION: ICD-10-CM

## 2022-08-09 DIAGNOSIS — F41.9 ANXIETY AND DEPRESSION: ICD-10-CM

## 2022-08-09 DIAGNOSIS — I48.0 PAROXYSMAL ATRIAL FIBRILLATION (HCC): ICD-10-CM

## 2022-08-09 DIAGNOSIS — E78.2 MIXED HYPERLIPIDEMIA: ICD-10-CM

## 2022-08-09 DIAGNOSIS — F41.9 ANXIETY: ICD-10-CM

## 2022-08-09 DIAGNOSIS — I42.9 CARDIOMYOPATHY, UNSPECIFIED TYPE (HCC): Primary | ICD-10-CM

## 2022-08-09 DIAGNOSIS — I50.22 CHRONIC SYSTOLIC (CONGESTIVE) HEART FAILURE (HCC): ICD-10-CM

## 2022-08-09 DIAGNOSIS — F32.A ANXIETY AND DEPRESSION: ICD-10-CM

## 2022-08-09 PROCEDURE — 93000 ELECTROCARDIOGRAM COMPLETE: CPT | Performed by: INTERNAL MEDICINE

## 2022-08-09 PROCEDURE — 99214 OFFICE O/P EST MOD 30 MIN: CPT | Performed by: INTERNAL MEDICINE

## 2022-08-09 RX ORDER — SERTRALINE HYDROCHLORIDE 100 MG/1
100 TABLET, FILM COATED ORAL DAILY
Qty: 90 TABLET | Refills: 3 | Status: SHIPPED | OUTPATIENT
Start: 2022-08-09

## 2022-08-09 RX ORDER — CARVEDILOL 12.5 MG/1
12.5 TABLET ORAL 2 TIMES DAILY
Qty: 180 TABLET | Refills: 3 | Status: SHIPPED | OUTPATIENT
Start: 2022-08-09 | End: 2022-09-19 | Stop reason: SDUPTHER

## 2022-08-09 RX ORDER — ALPRAZOLAM 0.25 MG/1
0.25 TABLET ORAL 2 TIMES DAILY PRN
Qty: 60 TABLET | Refills: 5 | Status: SHIPPED | OUTPATIENT
Start: 2022-08-09

## 2022-08-09 NOTE — ASSESSMENT & PLAN NOTE
Patient has a history of cardiomyopathy  Last echocardiogram had shown ejection fraction the  [place] of 60%  Patient now complains of breath exertion fatigue    I would like to arrange for follow-up echocardiogram

## 2022-08-09 NOTE — LETTER
August 9, 2022     Dorita Wylie, DO  [address] [facility]  [place] [address] [address]    Patient: Mitchell Rausch   YOB: 1945   Date of Visit: 8/9/2022       Dear Dr Eva Vaughan:    Thank you for referring Marti Peña to me for evaluation  Below are my notes for this consultation  If you have questions, please do not hesitate to call me  I look forward to following your patient along with you  Sincerely,        Jimmy Naranjo MD        CC: No Recipients  Jimmy Naranjo MD  8/9/2022  2:56 PM  Sign when Signing Visit  Assessment/Plan:    Cardiomyopathy [facility])    Patient has a history of cardiomyopathy  Last echocardiogram had shown ejection fraction the  Vicinity of 60%  Patient now complains of breath exertion fatigue  I would like to arrange for follow-up echocardiogram     Paroxysmal atrial fibrillation (HCC)    Paroxysmal atrial fibrillation, stable  The patient has been in regular rhythm  Mixed hyperlipidemia    Mixed hyperlipidemia, stable  We will continue Livalo  Anxiety and depression    Anxiety and depression, less than optimally controlled  This may be contributing to the patient's complain of chest discomfort and shortness of breath  I would however like to arrange for a regular exercise stress test        Diagnoses and all orders for this visit:    Cardiomyopathy[facility] unspecified type ([facility] )  -     POCT ECG  -     Stress test only, exercise; Future  -     Echo complete w/ contrast if indicated; Future    Paroxysmal atrial fibrillation (HCC)    Mixed hyperlipidemia    Anxiety and depression    Anxiety  -     ALPRAZolam (XANAX) 0 25 mg tablet; Take 1 tablet (0 25 mg total) by mouth 2 (two) times a day as needed for anxiety  -     sertraline (ZOLOFT) 100 mg tablet; Take 1 tablet (100 mg total) by mouth daily    Essential (primary) hypertension  -     carvedilol (COREG) 12 5 mg tablet;  Take 1 tablet (12 5 mg total) by mouth 2 (two) times a day    Chronic systolic (congestive) heart failure (HCC)   -     Stress test only, exercise; Future          Subjective:   Fatigued with chest discomfort  Patient ID: Seferino Segundo is a 64 y o  female  The patient presented to this office for the purpose of cardiac follow-up  She is known to have history cardiomyopathy paroxysmal atrial fibrillation  The patient also a history of hyperlipidemia  Patient has been feeling tired  She also describes symptom chest tightness  This is most noticeable when she is anxious  It does not seem to be related to physical activity  She denies any leg swelling  The following portions of the patient's history were reviewed and updated as appropriate: allergies, current medications, past family history, past medical history, past social history, past surgical history and problem list     Review of Systems   Respiratory: Positive for chest tightness and shortness of breath  Negative for apnea, cough and wheezing  Cardiovascular: Negative for chest pain, palpitations and leg swelling  Gastrointestinal: Negative for abdominal pain  Neurological: Negative for dizziness and light-headedness  Psychiatric/Behavioral: The patient is nervous/anxious  Objective:  Stable cardiac-wise  /72 (BP Location: Left arm, Patient Position: Sitting, Cuff Size: Standard)   Pulse 55   Ht 5' 9" (1 753 m)   Wt 63 5 kg (140 lb)   SpO2 97%   BMI 20 67 kg/m²          Physical Exam  Vitals reviewed  Constitutional:       General: She is not in acute distress  Appearance: She is well-developed  She is not diaphoretic  HENT:      Head: Normocephalic and atraumatic  Neck:      Thyroid: No thyromegaly  Vascular: No JVD  Cardiovascular:      Rate and Rhythm: Normal rate and regular rhythm  Heart sounds: S1 normal and S2 normal  No murmur heard  No friction rub  No gallop  Pulmonary:      Effort: Pulmonary effort is normal  No respiratory distress        Breath sounds: No wheezing or rales  Chest:      Chest wall: No tenderness  Abdominal:      Palpations: Abdomen is soft  Musculoskeletal:      Cervical back: Normal range of motion and neck supple  Right lower leg: No edema  Left lower leg: No edema  Skin:     General: Skin is warm and dry  Neurological:      Mental Status: She is oriented to person, place, and time     Psychiatric:         Mood and Affect: Mood normal          Behavior: Behavior normal

## 2022-08-09 NOTE — ASSESSMENT & PLAN NOTE
Anxiety and depression, less than optimally controlled  This may be contributing to the patient's complain of chest discomfort and shortness of breath    I would however like to arrange for a regular exercise stress test

## 2022-08-09 NOTE — PROGRESS NOTES
Assessment/Plan:    Cardiomyopathy [facility])    Patient has a history of cardiomyopathy  Last echocardiogram had shown ejection fraction the  Vicinity of 60%  Patient now complains of breath exertion fatigue  I would like to arrange for follow-up echocardiogram     Paroxysmal atrial fibrillation (HCC)    Paroxysmal atrial fibrillation, stable  The patient has been in regular rhythm  Mixed hyperlipidemia    Mixed hyperlipidemia, stable  We will continue Livalo  Anxiety and depression    Anxiety and depression, less than optimally controlled  This may be contributing to the patient's complain of chest discomfort and shortness of breath  I would however like to arrange for a regular exercise stress test        Diagnoses and all orders for this visit:    Cardiomyopathy, unspecified type ([facility] )  -     POCT ECG  -     Stress test only, exercise; Future  -     Echo complete w/ contrast if indicated; Future    Paroxysmal atrial fibrillation (HCC)    Mixed hyperlipidemia    Anxiety and depression    Anxiety  -     ALPRAZolam (XANAX) 0 25 mg tablet; Take 1 tablet (0 25 mg total) by mouth 2 (two) times a day as needed for anxiety  -     sertraline (ZOLOFT) 100 mg tablet; Take 1 tablet (100 mg total) by mouth daily    Essential (primary) hypertension  -     carvedilol (COREG) 12 5 mg tablet; Take 1 tablet (12 5 mg total) by mouth 2 (two) times a day    Chronic systolic (congestive) heart failure (HCC)   -     Stress test only, exercise; Future          Subjective:   Fatigued with chest discomfort  Patient ID: Linda Dunham is a 64 y o  female  The patient presented to this office for the purpose of cardiac follow-up  She is known to have history cardiomyopathy paroxysmal atrial fibrillation  The patient also a history of hyperlipidemia  Patient has been feeling tired  She also describes symptom chest tightness  This is most noticeable when she is anxious    It does not seem to be related to physical activity  She denies any leg swelling  The following portions of the patient's history were reviewed and updated as appropriate: allergies, current medications, past family history, past medical history, past social history, past surgical history and problem list     Review of Systems   Respiratory: Positive for chest tightness and shortness of breath  Negative for apnea, cough and wheezing  Cardiovascular: Negative for chest pain, palpitations and leg swelling  Gastrointestinal: Negative for abdominal pain  Neurological: Negative for dizziness and light-headedness  Psychiatric/Behavioral: The patient is nervous/anxious  Objective:  Stable cardiac-wise  /72 (BP Location: Left arm, Patient Position: Sitting, Cuff Size: Standard)   Pulse 55   Ht 5' 9" (1 753 m)   Wt 63 5 kg (140 lb)   SpO2 97%   BMI 20 67 kg/m²          Physical Exam  Vitals reviewed  Constitutional:       General: She is not in acute distress  Appearance: She is well-developed  She is not diaphoretic  HENT:      Head: Normocephalic and atraumatic  Neck:      Thyroid: No thyromegaly  Vascular: No JVD  Cardiovascular:      Rate and Rhythm: Normal rate and regular rhythm  Heart sounds: S1 normal and S2 normal  No murmur heard  No friction rub  No gallop  Pulmonary:      Effort: Pulmonary effort is normal  No respiratory distress  Breath sounds: No wheezing or rales  Chest:      Chest wall: No tenderness  Abdominal:      Palpations: Abdomen is soft  Musculoskeletal:      Cervical back: Normal range of motion and neck supple  Right lower leg: No edema  Left lower leg: No edema  Skin:     General: Skin is warm and dry  Neurological:      Mental Status: She is oriented to person, place, and time     Psychiatric:         Mood and Affect: Mood normal          Behavior: Behavior normal

## 2022-08-09 NOTE — PATIENT INSTRUCTIONS
The patient will be scheduled for echocardiogram and regular exercise stress test   The patient will continue present medications

## 2022-09-13 ENCOUNTER — HOSPITAL ENCOUNTER (OUTPATIENT)
Dept: RADIOLOGY | Facility: CLINIC | Age: 77
Discharge: HOME | End: 2022-09-13
Payer: MEDICARE

## 2022-09-13 ENCOUNTER — TELEPHONE (OUTPATIENT)
Dept: RADIOLOGY | Facility: CLINIC | Age: 77
End: 2022-09-13

## 2022-09-13 VITALS
HEART RATE: 53 BPM | TEMPERATURE: 97.8 F | RESPIRATION RATE: 20 BRPM | DIASTOLIC BLOOD PRESSURE: 70 MMHG | OXYGEN SATURATION: 97 % | SYSTOLIC BLOOD PRESSURE: 165 MMHG

## 2022-09-13 DIAGNOSIS — M51.16 INTERVERTEBRAL DISC DISORDER WITH RADICULOPATHY OF LUMBAR REGION: ICD-10-CM

## 2022-09-13 DIAGNOSIS — M48.061 SPINAL STENOSIS OF LUMBAR REGION, UNSPECIFIED WHETHER NEUROGENIC CLAUDICATION PRESENT: ICD-10-CM

## 2022-09-13 PROCEDURE — 64483 NJX AA&/STRD TFRM EPI L/S 1: CPT | Performed by: ANESTHESIOLOGY

## 2022-09-13 RX ORDER — 0.9 % SODIUM CHLORIDE 0.9 %
4 VIAL (ML) INJECTION ONCE
Status: COMPLETED | OUTPATIENT
Start: 2022-09-13 | End: 2022-09-13

## 2022-09-13 RX ORDER — BUPIVACAINE HCL/PF 2.5 MG/ML
2 VIAL (ML) INJECTION ONCE
Status: COMPLETED | OUTPATIENT
Start: 2022-09-13 | End: 2022-09-13

## 2022-09-13 RX ORDER — METHYLPREDNISOLONE ACETATE 80 MG/ML
80 INJECTION, SUSPENSION INTRA-ARTICULAR; INTRALESIONAL; INTRAMUSCULAR; PARENTERAL; SOFT TISSUE ONCE
Status: COMPLETED | OUTPATIENT
Start: 2022-09-13 | End: 2022-09-13

## 2022-09-13 RX ADMIN — Medication 1 ML: at 14:53

## 2022-09-13 RX ADMIN — METHYLPREDNISOLONE ACETATE 80 MG: 80 INJECTION, SUSPENSION INTRA-ARTICULAR; INTRALESIONAL; INTRAMUSCULAR; PARENTERAL; SOFT TISSUE at 14:55

## 2022-09-13 RX ADMIN — IOHEXOL 1 ML: 300 INJECTION, SOLUTION INTRAVENOUS at 14:54

## 2022-09-13 RX ADMIN — BUPIVACAINE HYDROCHLORIDE 2 ML: 2.5 INJECTION, SOLUTION EPIDURAL; INFILTRATION; INTRACAUDAL at 14:55

## 2022-09-13 RX ADMIN — Medication 4 ML: at 14:53

## 2022-09-13 NOTE — H&P
History of Present Illness:  The patient is a 64 y o  female who presents with complaints of lower back and leg pain secondary to stenosis and is here today for bilateral L3 transforaminal epidural steroid injection    Patient Active Problem List   Diagnosis    [facility]    Mixed hyperlipidemia    Paroxysmal atrial fibrillation (HCC)    Anxiety and depression    Primary osteoarthritis of left hip    Intervertebral disc disorder with radiculopathy of lumbar region    Lumbar stenosis with neurogenic claudication    [facility]roiliitis ([facility] )       Past Medical History:   Diagnosis Date    Anxiety     [facility]     Cardiac dysrhythmia     [facility]     [facility]     Depression     Hyperlipidemia     Hyperthyroidism     Hypothyroid     Palpitation     Post-nasal drip        Past Surgical History:   Procedure Laterality Date    FL INJECTION LEFT HIP (NON ARTHROGRAM)  2/26/2021    FL INJECTION LEFT HIP (NON ARTHROGRAM)  6/1/2021    JOINT REPLACEMENT Right     partial right knee replacement         Current Outpatient Medications:     ALPRAZolam (XANAX) 0 25 mg tablet, Take 1 tablet (0 25 mg total) by mouth 2 (two) times a day as needed for anxiety, Disp: 60 tablet, Rfl: 5    carvedilol (COREG) 12 5 mg tablet, Take 1 tablet (12 5 mg total) by mouth 2 (two) times a day, Disp: 180 tablet, Rfl: 3    cholecalciferol (VITAMIN D3) 1,000 units tablet, Take by mouth, Disp: , Rfl:     ezetimibe (ZETIA) 10 mg tablet, Take 1 tablet (10 mg total) by mouth daily, Disp: 90 tablet, Rfl: 3    gabapentin (NEURONTIN) 300 mg capsule, Take 1 capsule (300 mg total) by mouth 2 (two) times a day, Disp: 180 capsule, Rfl: 3    levothyroxine 100 mcg tablet, Take 1 tablet (100 mcg total) by mouth daily, Disp: 90 tablet, Rfl: 3    meloxicam (MOBIC) 15 mg tablet, Take 1 tablet (15 mg total) by mouth daily, Disp: 90 tablet, Rfl: 0    pitavastatin (Livalo) 2 mg, Take 1 tablet (2 mg total) by mouth daily, Disp: 90 tablet, Rfl: 3    sertraline (ZOLOFT) 100 mg tablet, Take 1 tablet (100 mg total) by mouth daily, Disp: 90 tablet, Rfl: 3    Current Facility-Administered Medications:     bupivacaine (PF) (MARCAINE) 0 25 % injection 2 mL, 2 mL, Epidural, Once, Nubia Thakur MD    iohexol (OMNIPAQUE) 300 mg/mL injection 1 mL, 1 mL, Epidural, Once, Nubia Thakur MD    lidocaine (PF) (XYLOCAINE-MPF) 2 % injection 4 mL, 4 mL, Infiltration, Once, Nubia Thakur MD    methylPREDNISolone acetate (DEPO-MEDROL) injection 80 mg, 80 mg, Epidural, Once, Nubia Thakur MD    sodium chloride (PF) 0 9 % injection 4 mL, 4 mL, Infiltration, Once, Nubia Thakur MD    Allergies   Allergen Reactions    Methimazole        Physical Exam:   Vitals:    09/13/22 1432   BP: 145/70   Pulse: 55   Resp: 20   Temp: 97 8 °F (36 6 °C)   SpO2: 97%     General: Awake, Alert, Oriented x 3, Mood and affect appropriate  Respiratory: Respirations even and unlabored  Cardiovascular: Peripheral pulses intact; no edema  Musculoskeletal Exam:  Lower back pain    ASA Score: 3    Patient/Chart Verification  Patient ID Verified: Verbal  Consents Confirmed: To be obtained in the Pre-Procedure area  Allergies Reviewed: Yes  Anticoag/NSAID held?: NA  Currently on antibiotics?: No    Assessment:   1   Intervertebral disc disorder with radiculopathy of lumbar region        Plan: Bilateral L3 TFESI

## 2022-09-13 NOTE — DISCHARGE INSTR - LAB
Epidural Steroid Injection   WHAT YOU NEED TO KNOW:   An epidural steroid injection (IMELDA) is a procedure to inject steroid medicine into the epidural space  The epidural space is between your spinal cord and vertebrae  Steroids reduce inflammation and fluid buildup in your spine that may be causing pain  You may be given pain medicine along with the steroids  ACTIVITY  Do not drive or operate machinery today  No strenuous activity today - bending, lifting, etc   You may resume normal activites starting tomorrow - start slowly and as tolerated  You may shower today, but no tub baths or hot tubs  You may have numbness for several hours from the local anesthetic  Please use caution and common sense, especially with weight-bearing activities  CARE OF THE INJECTION SITE  If you have soreness or pain, apply ice to the area today (20 minutes on/20 minutes off)  Starting tomorrow, you may use warm, moist heat or ice if needed  You may have an increase or change in your discomfort for 36-48 hours after your treatment  Apply ice and continue with any pain medication you have been prescribed  Notify the [facility] if you have any of the following: redness, drainage, swelling, headache, stiff neck or fever above 100°F     SPECIAL INSTRUCTIONS  Our office will contact you in approximately 7 days for a progress report  MEDICATIONS  Continue to take all routine medications  Our office may have instructed you to hold some medications  As no general anesthesia was used in today's procedure, you should not experience any side effects related to anesthesia  If you are diabetic, the steroids used in today's injection may temporarily increase your blood sugar levels after the first few days after your injection  Please keep a close eye on your sugars and alert the doctor who manages your diabetes if your sugars are significantly high from your baseline or you are symptomatic       If you have a problem specifically related to your procedure, please call our office at [phone]  Problems not related to your procedure should be directed to your primary care physician

## 2022-09-14 RX ORDER — GABAPENTIN 300 MG/1
300 CAPSULE ORAL 2 TIMES DAILY
Qty: 180 CAPSULE | Refills: 3 | Status: SHIPPED | OUTPATIENT
Start: 2022-09-14

## 2022-09-19 DIAGNOSIS — I10 ESSENTIAL (PRIMARY) HYPERTENSION: ICD-10-CM

## 2022-09-19 RX ORDER — CARVEDILOL 12.5 MG/1
12.5 TABLET ORAL 2 TIMES DAILY
Qty: 180 TABLET | Refills: 3 | Status: SHIPPED | OUTPATIENT
Start: 2022-09-19

## 2022-09-20 ENCOUNTER — TELEPHONE (OUTPATIENT)
Dept: PAIN MEDICINE | Facility: CLINIC | Age: 77
End: 2022-09-20

## 2023-01-13 DIAGNOSIS — I10 ESSENTIAL (PRIMARY) HYPERTENSION: ICD-10-CM

## 2023-01-15 RX ORDER — LEVOTHYROXINE SODIUM 0.1 MG/1
100 TABLET ORAL DAILY
Qty: 90 TABLET | Refills: 0 | Status: SHIPPED | OUTPATIENT
Start: 2023-01-15

## 2023-02-05 ENCOUNTER — TELEPHONE (OUTPATIENT)
Dept: OTHER | Facility: OTHER | Age: 78
End: 2023-02-05

## 2023-02-05 NOTE — TELEPHONE ENCOUNTER
Patient is calling regarding cancelling an appointment      Date/Time:2-7-2023 @ 14:00 pm    Patient was rescheduled: YES [] NO [x]    Patient requesting call back to reschedule: YES [] NO [x]

## 2023-02-22 ENCOUNTER — HOSPITAL ENCOUNTER (OUTPATIENT)
Dept: NON INVASIVE DIAGNOSTICS | Facility: CLINIC | Age: 78
Discharge: HOME | End: 2023-02-22

## 2023-02-22 VITALS
BODY MASS INDEX: 20.73 KG/M2 | SYSTOLIC BLOOD PRESSURE: 165 MMHG | DIASTOLIC BLOOD PRESSURE: 70 MMHG | WEIGHT: 140 LBS | HEART RATE: 53 BPM | HEIGHT: 69 IN

## 2023-02-22 VITALS
DIASTOLIC BLOOD PRESSURE: 86 MMHG | BODY MASS INDEX: 20.73 KG/M2 | HEIGHT: 69 IN | OXYGEN SATURATION: 97 % | WEIGHT: 140 LBS | SYSTOLIC BLOOD PRESSURE: 154 MMHG | HEART RATE: 57 BPM

## 2023-02-22 DIAGNOSIS — I42.9 CARDIOMYOPATHY, UNSPECIFIED TYPE (HCC): ICD-10-CM

## 2023-02-22 DIAGNOSIS — I50.22 CHRONIC SYSTOLIC (CONGESTIVE) HEART FAILURE (HCC): ICD-10-CM

## 2023-02-22 LAB
AORTIC ROOT: 2.9 CM
APICAL FOUR CHAMBER EJECTION FRACTION: 64 %
ASCENDING AORTA: 3.6 CM
AV REGURGITATION PRESSURE HALF TIME: 726 MS
CHEST PAIN STATEMENT: NORMAL
E WAVE DECELERATION TIME: 210 MS
FRACTIONAL SHORTENING: 33 (ref 28–44)
INTERVENTRICULAR SEPTUM IN DIASTOLE (PARASTERNAL SHORT AXIS VIEW): 1.1 CM
INTERVENTRICULAR SEPTUM: 1.1 CM (ref 0.6–1.1)
LAAS-AP2: 24.2 CM2
LAAS-AP4: 23.8 CM2
LEFT ATRIUM SIZE: 4.2 CM
LEFT INTERNAL DIMENSION IN SYSTOLE: 3.3 CM (ref 2.1–4)
LEFT VENTRICULAR INTERNAL DIMENSION IN DIASTOLE: 4.9 CM (ref 3.5–6)
LEFT VENTRICULAR POSTERIOR WALL IN END DIASTOLE: 1 CM
LEFT VENTRICULAR STROKE VOLUME: 69 ML
LVSV (TEICH): 69 ML
MAX DIASTOLIC BP: 88 MMHG
MAX HEART RATE: 114 BPM
MAX HR PERCENT: 67 %
MAX HR: 101 BPM
MAX PREDICTED HEART RATE: 141 BPM
MAX. SYSTOLIC BP: 160 MMHG
MV E'TISSUE VEL-SEP: 4 CM/S
MV PEAK A VEL: 0.87 M/S
MV PEAK E VEL: 81 CM/S
MV STENOSIS PRESSURE HALF TIME: 61 MS
MV VALVE AREA P 1/2 METHOD: 3.61
PROTOCOL NAME: NORMAL
RATE PRESSURE PRODUCT: NORMAL
REASON FOR TERMINATION: NORMAL
RIGHT ATRIAL 2D VOLUME: 37 ML
RIGHT ATRIUM AREA SYSTOLE A4C: 13.9 CM2
RIGHT VENTRICLE ID DIMENSION: 3.6 CM
SL CV AV DECELERATION TIME RETROGRADE: 2502 MS
SL CV AV PEAK GRADIENT RETROGRADE: 47 MMHG
SL CV LEFT ATRIUM LENGTH A2C: 5.7 CM
SL CV LV EF: 62
SL CV PED ECHO LEFT VENTRICLE DIASTOLIC VOLUME (MOD BIPLANE) 2D: 113 ML
SL CV PED ECHO LEFT VENTRICLE SYSTOLIC VOLUME (MOD BIPLANE) 2D: 44 ML
SL CV STRESS RECOVERY BP: NORMAL MMHG
SL CV STRESS RECOVERY HR: 64 BPM
SL CV STRESS RECOVERY O2 SAT: 99 %
SL CV STRESS STAGE REACHED: 2
STRESS ANGINA INDEX: 0
STRESS BASELINE BP: NORMAL MMHG
STRESS BASELINE HR: 57 BPM
STRESS DUKE TREADMILL SCORE: 3
STRESS O2 SAT REST: 97 %
STRESS PEAK HR: 101 BPM
STRESS POST ESTIMATED WORKLOAD: 4.9 METS
STRESS POST EXERCISE DUR MIN: 3 MIN
STRESS POST EXERCISE DUR SEC: 8 SEC
STRESS POST O2 SAT PEAK: 99 %
STRESS POST PEAK BP: 150 MMHG
STRESS ST DEPRESSION: 0 MM
TARGET HR FORMULA: NORMAL
TEST INDICATION: NORMAL
TIME IN EXERCISE PHASE: NORMAL
TR MAX PG: 13 MMHG
TR PEAK VELOCITY: 1.8 M/S
TRICUSPID ANNULAR PLANE SYSTOLIC EXCURSION: 2.2 CM
TRICUSPID VALVE PEAK REGURGITATION VELOCITY: 1.83 M/S

## 2023-03-25 DIAGNOSIS — I10 ESSENTIAL (PRIMARY) HYPERTENSION: ICD-10-CM

## 2023-03-26 RX ORDER — CARVEDILOL 12.5 MG/1
TABLET ORAL
Qty: 180 TABLET | Refills: 3 | Status: SHIPPED | OUTPATIENT
Start: 2023-03-26

## 2023-03-27 DIAGNOSIS — F41.9 ANXIETY: ICD-10-CM

## 2023-03-27 RX ORDER — SERTRALINE HYDROCHLORIDE 100 MG/1
TABLET, FILM COATED ORAL
Qty: 90 TABLET | Refills: 3 | Status: SHIPPED | OUTPATIENT
Start: 2023-03-27

## 2023-03-28 DIAGNOSIS — E78.2 MIXED HYPERLIPIDEMIA: ICD-10-CM

## 2023-04-25 DIAGNOSIS — I10 ESSENTIAL (PRIMARY) HYPERTENSION: ICD-10-CM

## 2023-04-25 RX ORDER — LEVOTHYROXINE SODIUM 0.1 MG/1
100 TABLET ORAL DAILY
Qty: 90 TABLET | Refills: 2 | Status: SHIPPED | OUTPATIENT
Start: 2023-04-25

## 2023-06-08 DIAGNOSIS — E78.2 MIXED HYPERLIPIDEMIA: ICD-10-CM

## 2023-06-08 RX ORDER — EZETIMIBE 10 MG/1
10 TABLET ORAL DAILY
Qty: 90 TABLET | Refills: 3 | Status: SHIPPED | OUTPATIENT
Start: 2023-06-08

## 2023-06-22 ENCOUNTER — TELEPHONE (OUTPATIENT)
Dept: CARDIOLOGY CLINIC | Facility: CLINIC | Age: 78
End: 2023-06-22

## 2023-06-22 NOTE — TELEPHONE ENCOUNTER
P/C going to be scheduling her appt with you in the future     She would like to have bw orders    Please advise

## 2023-06-23 DIAGNOSIS — E78.2 MIXED HYPERLIPIDEMIA: Primary | ICD-10-CM

## 2023-06-23 DIAGNOSIS — I48.0 PAROXYSMAL ATRIAL FIBRILLATION (HCC): ICD-10-CM

## 2023-06-23 NOTE — TELEPHONE ENCOUNTER
Called and spoke to pt and advised labs ordered, pt uses [facility] and knows to fast, pt verbally understood

## 2023-07-06 ENCOUNTER — APPOINTMENT (OUTPATIENT)
Dept: LAB | Facility: CLINIC | Age: 78
End: 2023-07-06
Payer: MEDICARE

## 2023-07-06 DIAGNOSIS — I48.0 PAROXYSMAL ATRIAL FIBRILLATION (HCC): ICD-10-CM

## 2023-07-06 DIAGNOSIS — E78.2 MIXED HYPERLIPIDEMIA: ICD-10-CM

## 2023-07-06 LAB
ALBUMIN SERPL BCP-MCNC: 4 G/DL (ref 3.5–5)
ALP SERPL-CCNC: 71 U/L (ref 46–116)
ALT SERPL W P-5'-P-CCNC: 30 U/L (ref 12–78)
ANION GAP SERPL CALCULATED.3IONS-SCNC: 3 MMOL/L
AST SERPL W P-5'-P-CCNC: 38 U/L (ref 5–45)
BASOPHILS # BLD AUTO: 0.06 THOUSANDS/ÂΜL (ref 0–0.1)
BASOPHILS NFR BLD AUTO: 1 % (ref 0–1)
BILIRUB SERPL-MCNC: 0.53 MG/DL (ref 0.2–1)
BUN SERPL-MCNC: 13 MG/DL (ref 5–25)
CALCIUM SERPL-MCNC: 9.5 MG/DL (ref 8.3–10.1)
CHLORIDE SERPL-SCNC: 110 MMOL/L (ref 96–108)
CHOLEST SERPL-MCNC: 160 MG/DL
CO2 SERPL-SCNC: 27 MMOL/L (ref 21–32)
CREAT SERPL-MCNC: 1.02 MG/DL (ref 0.6–1.3)
EOSINOPHIL # BLD AUTO: 0.37 THOUSAND/ÂΜL (ref 0–0.61)
EOSINOPHIL NFR BLD AUTO: 6 % (ref 0–6)
ERYTHROCYTE [DISTWIDTH] IN BLOOD BY AUTOMATED COUNT: 12.9 % (ref 11.6–15.1)
GFR SERPL CREATININE-BSD FRML MDRD: 52 ML/MIN/1.73SQ M
GLUCOSE P FAST SERPL-MCNC: 98 MG/DL (ref 65–99)
HCT VFR BLD AUTO: 40.6 % (ref 34.8–46.1)
HDLC SERPL-MCNC: 63 MG/DL
HGB BLD-MCNC: 12.8 G/DL (ref 11.5–15.4)
IMM GRANULOCYTES # BLD AUTO: 0.02 THOUSAND/UL (ref 0–0.2)
IMM GRANULOCYTES NFR BLD AUTO: 0 % (ref 0–2)
LDLC SERPL CALC-MCNC: 83 MG/DL (ref 0–100)
LYMPHOCYTES # BLD AUTO: 1.32 THOUSANDS/ÂΜL (ref 0.6–4.47)
LYMPHOCYTES NFR BLD AUTO: 21 % (ref 14–44)
MCH RBC QN AUTO: 30 PG (ref 26.8–34.3)
MCHC RBC AUTO-ENTMCNC: 31.5 G/DL (ref 31.4–37.4)
MCV RBC AUTO: 95 FL (ref 82–98)
MONOCYTES # BLD AUTO: 0.55 THOUSAND/ÂΜL (ref 0.17–1.22)
MONOCYTES NFR BLD AUTO: 9 % (ref 4–12)
NEUTROPHILS # BLD AUTO: 3.86 THOUSANDS/ÂΜL (ref 1.85–7.62)
NEUTS SEG NFR BLD AUTO: 63 % (ref 43–75)
NRBC BLD AUTO-RTO: 0 /100 WBCS
PLATELET # BLD AUTO: 145 THOUSANDS/UL (ref 149–390)
PMV BLD AUTO: 11.7 FL (ref 8.9–12.7)
POTASSIUM SERPL-SCNC: 4.6 MMOL/L (ref 3.5–5.3)
PROT SERPL-MCNC: 7.2 G/DL (ref 6.4–8.4)
RBC # BLD AUTO: 4.27 MILLION/UL (ref 3.81–5.12)
SODIUM SERPL-SCNC: 140 MMOL/L (ref 135–147)
TRIGL SERPL-MCNC: 70 MG/DL
TSH SERPL DL<=0.05 MIU/L-ACNC: 1.12 UIU/ML (ref 0.45–4.5)
WBC # BLD AUTO: 6.18 THOUSAND/UL (ref 4.31–10.16)

## 2023-07-06 PROCEDURE — 36415 COLL VENOUS BLD VENIPUNCTURE: CPT

## 2023-07-06 PROCEDURE — 80061 LIPID PANEL: CPT

## 2023-07-06 PROCEDURE — 85025 COMPLETE CBC W/AUTO DIFF WBC: CPT

## 2023-07-06 PROCEDURE — 80053 COMPREHEN METABOLIC PANEL: CPT

## 2023-07-11 ENCOUNTER — OFFICE VISIT (OUTPATIENT)
Dept: CARDIOLOGY CLINIC | Facility: CLINIC | Age: 78
End: 2023-07-11
Payer: MEDICARE

## 2023-07-11 VITALS
BODY MASS INDEX: 20.59 KG/M2 | SYSTOLIC BLOOD PRESSURE: 144 MMHG | WEIGHT: 139 LBS | DIASTOLIC BLOOD PRESSURE: 84 MMHG | HEIGHT: 69 IN | HEART RATE: 60 BPM | OXYGEN SATURATION: 98 %

## 2023-07-11 DIAGNOSIS — F41.9 ANXIETY AND DEPRESSION: ICD-10-CM

## 2023-07-11 DIAGNOSIS — E78.2 MIXED HYPERLIPIDEMIA: ICD-10-CM

## 2023-07-11 DIAGNOSIS — R07.9 CHEST PAIN, UNSPECIFIED TYPE: ICD-10-CM

## 2023-07-11 DIAGNOSIS — F32.A ANXIETY AND DEPRESSION: ICD-10-CM

## 2023-07-11 DIAGNOSIS — I48.0 PAROXYSMAL ATRIAL FIBRILLATION (HCC): ICD-10-CM

## 2023-07-11 DIAGNOSIS — I42.9 CARDIOMYOPATHY, UNSPECIFIED TYPE (HCC): Primary | ICD-10-CM

## 2023-07-11 PROCEDURE — 93000 ELECTROCARDIOGRAM COMPLETE: CPT | Performed by: INTERNAL MEDICINE

## 2023-07-11 PROCEDURE — 99213 OFFICE O/P EST LOW 20 MIN: CPT | Performed by: INTERNAL MEDICINE

## 2023-07-11 NOTE — ASSESSMENT & PLAN NOTE
Remote history of paroxysmal atrial fibrillation in the context of hyperthyroidism. This has been stable and the patient has been in sinus mechanism.

## 2023-07-11 NOTE — LETTER
July 11, 2023     Isaias Allan DO  [address] [facility]    Patient: Deidra Alaniz   YOB: 1945   Date of Visit: 7/11/2023       Dear Dr. Cobos Cover:    Thank you for referring Odell Seaman to me for evaluation. Below are my notes for this consultation. If you have questions, please do not hesitate to call me. I look forward to following your patient along with you. Sincerely,        Christopher Ibrahim MD        CC: No Recipients    Christopher Ibrahim MD  7/11/2023 11:58 AM  Sign when Signing Visit  Assessment/Plan:    Cardiomyopathy 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 This issue has been resolved in the past.  The patient however at this time is complaining of shortness of breath and some chest discomfort. I will therefore arrange for the patient to have a pharmacologic nuclear stress test and echocardiogram.    Paroxysmal atrial fibrillation (HCC)  Remote history of paroxysmal atrial fibrillation in the context of hyperthyroidism. This has been stable and the patient has been in sinus mechanism. Mixed hyperlipidemia  Hyperlipidemia, stable. Anxiety and depression  Anxiety disorder, exacerbated with the illness of her . This may be contributing to the patient's other symptoms. Diagnoses and all orders for this visit:    Cardiomyopathy, unspecified type ([address])  -     Echo complete w/ contrast if indicated; Future  -     NM myocardial perfusion spect (rx stress and/or rest); Future    Paroxysmal atrial fibrillation (HCC)    Mixed hyperlipidemia    Anxiety and depression    Chest pain, unspecified type  -     POCT ECG  -     Echo complete w/ contrast if indicated; Future  -     NM myocardial perfusion spect (rx stress and/or rest); Future         Subjective: Chest pain, shortness of breath and lightheadedness. Patient ID: Deidra Alaniz is a 76 y.o. female.     The patient presented to this office for the purpose of cardiac follow-up. She is known to have a history of cardiomyopathy secondary to hyperthyroidism and tachycardia at the time. This has been stable over the last several years. At this point however the patient is complaining of symptoms of dyspnea both at rest and while she is active. The patient also describes symptom of pressure sensation to the left of the sternum occurring at rest lasting for 10 minutes and subsiding spontaneously. She is wondering if this is aggravated by higher level of anxiety associated with the illness of her . She has symptoms of lightheadedness mostly upon arising. She has no leg edema. The following portions of the patient's history were reviewed and updated as appropriate: allergies, current medications, past family history, past medical history, past social history, past surgical history and problem list.    Review of Systems   Respiratory: Positive for shortness of breath. Negative for apnea, cough, chest tightness and wheezing. Cardiovascular: Positive for chest pain. Negative for palpitations and leg swelling. Gastrointestinal: Negative for abdominal pain. Neurological: Positive for light-headedness. Negative for dizziness. Objective: Stable cardiac wise. /84 (BP Location: Left arm, Patient Position: Sitting, Cuff Size: Standard)   Pulse 60   Ht 5' 9" (1.753 m)   Wt 63 kg (139 lb)   SpO2 98%   BMI 20.53 kg/m²         Physical Exam  Vitals reviewed. Constitutional:       General: She is not in acute distress. Appearance: She is well-developed. She is not diaphoretic. HENT:      Head: Normocephalic and atraumatic. Neck:      Thyroid: No thyromegaly. Vascular: No JVD. Cardiovascular:      Rate and Rhythm: Normal rate and regular rhythm. Heart sounds: S1 normal and S2 normal. No murmur heard. No friction rub. No gallop.    Pulmonary:      Effort: Pulmonary effort is normal. No respiratory distress. Breath sounds: No wheezing or rales. Chest:      Chest wall: No tenderness. Abdominal:      Palpations: Abdomen is soft. Musculoskeletal:      Cervical back: Normal range of motion and neck supple. Right lower leg: No edema. Left lower leg: No edema. Skin:     General: Skin is warm and dry.    Psychiatric:         Mood and Affect: Mood normal.         Behavior: Behavior normal.

## 2023-07-11 NOTE — ASSESSMENT & PLAN NOTE
Anxiety disorder, exacerbated with the illness of her . This may be contributing to the patient's other symptoms.

## 2023-07-11 NOTE — PROGRESS NOTES
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 This issue has been resolved in the past.  The patient however at this time is complaining of shortness of breath and some chest discomfort. I will therefore arrange for the patient to have a pharmacologic nuclear stress test and echocardiogram.    Paroxysmal atrial fibrillation (HCC)  Remote history of paroxysmal atrial fibrillation in the context of hyperthyroidism. This has been stable and the patient has been in sinus mechanism. Mixed hyperlipidemia  Hyperlipidemia, stable. Anxiety and depression  Anxiety disorder, exacerbated with the illness of her . This may be contributing to the patient's other symptoms. Diagnoses and all orders for this visit:    Cardiomyopathy, unspecified type ([address])  -     Echo complete w/ contrast if indicated; Future  -     NM myocardial perfusion spect (rx stress and/or rest); Future    Paroxysmal atrial fibrillation (HCC)    Mixed hyperlipidemia    Anxiety and depression    Chest pain, unspecified type  -     POCT ECG  -     Echo complete w/ contrast if indicated; Future  -     NM myocardial perfusion spect (rx stress and/or rest); Future          Subjective: Chest pain, shortness of breath and lightheadedness. Patient ID: Moose Barahona is a 76 y.o. female. The patient presented to this office for the purpose of cardiac follow-up. She is known to have a history of cardiomyopathy secondary to hyperthyroidism and tachycardia at the time. This has been stable over the last several years. At this point however the patient is complaining of symptoms of dyspnea both at rest and while she is active. The patient also describes symptom of pressure sensation to the left of the sternum occurring at rest lasting for 10 minutes and subsiding spontaneously.   She is wondering if this is aggravated by higher level of anxiety associated with the illness of her . She has symptoms of lightheadedness mostly upon arising. She has no leg edema. The following portions of the patient's history were reviewed and updated as appropriate: allergies, current medications, past family history, past medical history, past social history, past surgical history and problem list.    Review of Systems   Respiratory: Positive for shortness of breath. Negative for apnea, cough, chest tightness and wheezing. Cardiovascular: Positive for chest pain. Negative for palpitations and leg swelling. Gastrointestinal: Negative for abdominal pain. Neurological: Positive for light-headedness. Negative for dizziness. Objective: Stable cardiac wise. /84 (BP Location: Left arm, Patient Position: Sitting, Cuff Size: Standard)   Pulse 60   Ht 5' 9" (1.753 m)   Wt 63 kg (139 lb)   SpO2 98%   BMI 20.53 kg/m²          Physical Exam  Vitals reviewed. Constitutional:       General: She is not in acute distress. Appearance: She is well-developed. She is not diaphoretic. HENT:      Head: Normocephalic and atraumatic. Neck:      Thyroid: No thyromegaly. Vascular: No JVD. Cardiovascular:      Rate and Rhythm: Normal rate and regular rhythm. Heart sounds: S1 normal and S2 normal. No murmur heard. No friction rub. No gallop. Pulmonary:      Effort: Pulmonary effort is normal. No respiratory distress. Breath sounds: No wheezing or rales. Chest:      Chest wall: No tenderness. Abdominal:      Palpations: Abdomen is soft. Musculoskeletal:      Cervical back: Normal range of motion and neck supple. Right lower leg: No edema. Left lower leg: No edema. Skin:     General: Skin is warm and dry.    Psychiatric:         Mood and Affect: Mood normal.         Behavior: Behavior normal.

## 2023-07-11 NOTE — ASSESSMENT & PLAN NOTE
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 This issue has been resolved in the past.  The patient however at this time is complaining of shortness of breath and some chest discomfort.   I will therefore arrange for the patient to have a pharmacologic nuclear stress test and echocardiogram.

## 2023-07-11 NOTE — PATIENT INSTRUCTIONS
The patient will be scheduled for echocardiogram and pharmacologic nuclear stress test.  She will continue present medications.

## 2023-08-14 ENCOUNTER — HOSPITAL ENCOUNTER (OUTPATIENT)
Dept: NON INVASIVE DIAGNOSTICS | Facility: CLINIC | Age: 78
Discharge: HOME | End: 2023-08-14
Payer: MEDICARE

## 2023-08-14 VITALS — SYSTOLIC BLOOD PRESSURE: 118 MMHG | OXYGEN SATURATION: 98 % | DIASTOLIC BLOOD PRESSURE: 62 MMHG | HEART RATE: 55 BPM

## 2023-08-14 DIAGNOSIS — R07.9 CHEST PAIN, UNSPECIFIED TYPE: ICD-10-CM

## 2023-08-14 DIAGNOSIS — I42.9 CARDIOMYOPATHY, UNSPECIFIED TYPE (HCC): ICD-10-CM

## 2023-08-14 LAB
NUC STRESS EJECTION FRACTION: 74 %
RATE PRESSURE PRODUCT: NORMAL
SL CV REST NUCLEAR ISOTOPE DOSE: 9.23 MCI
SL CV STRESS NUCLEAR ISOTOPE DOSE: 28.4 MCI
SL CV STRESS RECOVERY BP: NORMAL MMHG
SL CV STRESS RECOVERY HR: 69 BPM
SL CV STRESS RECOVERY O2 SAT: 98 %
STRESS ANGINA INDEX: 0
STRESS BASELINE BP: NORMAL MMHG
STRESS BASELINE HR: 55 BPM
STRESS O2 SAT REST: 98 %
STRESS PEAK HR: 82 BPM
STRESS POST O2 SAT PEAK: 98 %
STRESS POST PEAK BP: 126 MMHG
STRESS/REST PERFUSION RATIO: 0.93

## 2023-08-14 PROCEDURE — 93016 CV STRESS TEST SUPVJ ONLY: CPT | Performed by: INTERNAL MEDICINE

## 2023-08-14 PROCEDURE — 93018 CV STRESS TEST I&R ONLY: CPT | Performed by: INTERNAL MEDICINE

## 2023-08-14 PROCEDURE — A9502 TC99M TETROFOSMIN: HCPCS

## 2023-08-14 PROCEDURE — 78452 HT MUSCLE IMAGE SPECT MULT: CPT

## 2023-08-14 PROCEDURE — G1004 CDSM NDSC: HCPCS

## 2023-08-14 PROCEDURE — 78452 HT MUSCLE IMAGE SPECT MULT: CPT | Performed by: INTERNAL MEDICINE

## 2023-08-14 PROCEDURE — 93017 CV STRESS TEST TRACING ONLY: CPT

## 2023-08-14 RX ORDER — REGADENOSON 0.08 MG/ML
0.4 INJECTION, SOLUTION INTRAVENOUS ONCE
Status: COMPLETED | OUTPATIENT
Start: 2023-08-14 | End: 2023-08-14

## 2023-08-14 RX ADMIN — REGADENOSON 0.4 MG: 0.08 INJECTION, SOLUTION INTRAVENOUS at 13:12

## 2023-08-15 LAB
CHEST PAIN STATEMENT: NORMAL
MAX DIASTOLIC BP: 68 MMHG
MAX HEART RATE: 82 BPM
MAX PREDICTED HEART RATE: 141 BPM
MAX. SYSTOLIC BP: 136 MMHG
PROTOCOL NAME: NORMAL
REASON FOR TERMINATION: NORMAL
TARGET HR FORMULA: NORMAL
TEST INDICATION: NORMAL
TIME IN EXERCISE PHASE: NORMAL

## 2023-08-21 ENCOUNTER — TELEPHONE (OUTPATIENT)
Dept: CARDIOLOGY CLINIC | Facility: CLINIC | Age: 78
End: 2023-08-21

## 2023-12-15 DIAGNOSIS — I10 ESSENTIAL (PRIMARY) HYPERTENSION: ICD-10-CM

## 2023-12-16 RX ORDER — LEVOTHYROXINE SODIUM 0.1 MG/1
100 TABLET ORAL DAILY
Qty: 90 TABLET | Refills: 2 | Status: SHIPPED | OUTPATIENT
Start: 2023-12-16

## 2024-01-22 DIAGNOSIS — E78.2 MIXED HYPERLIPIDEMIA: ICD-10-CM

## 2024-01-22 DIAGNOSIS — I10 ESSENTIAL (PRIMARY) HYPERTENSION: ICD-10-CM

## 2024-01-22 DIAGNOSIS — F41.9 ANXIETY: ICD-10-CM

## 2024-01-23 RX ORDER — PITAVASTATIN 2 MG/1
2 TABLET, FILM COATED ORAL DAILY
Qty: 90 TABLET | Refills: 2 | Status: SHIPPED | OUTPATIENT
Start: 2024-01-23

## 2024-01-23 RX ORDER — SERTRALINE HYDROCHLORIDE 100 MG/1
100 TABLET, FILM COATED ORAL DAILY
Qty: 90 TABLET | Refills: 3 | Status: SHIPPED | OUTPATIENT
Start: 2024-01-23

## 2024-01-23 RX ORDER — EZETIMIBE 10 MG/1
10 TABLET ORAL DAILY
Qty: 90 TABLET | Refills: 3 | Status: SHIPPED | OUTPATIENT
Start: 2024-01-23

## 2024-01-23 RX ORDER — CARVEDILOL 12.5 MG/1
12.5 TABLET ORAL 2 TIMES DAILY
Qty: 180 TABLET | Refills: 3 | Status: SHIPPED | OUTPATIENT
Start: 2024-01-23

## 2024-06-04 DIAGNOSIS — I10 ESSENTIAL (PRIMARY) HYPERTENSION: ICD-10-CM

## 2024-06-04 DIAGNOSIS — E78.2 MIXED HYPERLIPIDEMIA: ICD-10-CM

## 2024-06-05 RX ORDER — PITAVASTATIN CALCIUM 2.09 MG/1
2 TABLET, FILM COATED ORAL DAILY
Qty: 90 TABLET | Refills: 0 | Status: SHIPPED | OUTPATIENT
Start: 2024-06-05

## 2024-06-05 RX ORDER — LEVOTHYROXINE SODIUM 0.1 MG/1
100 TABLET ORAL DAILY
Qty: 90 TABLET | Refills: 0 | Status: SHIPPED | OUTPATIENT
Start: 2024-06-05

## 2024-06-15 DIAGNOSIS — Z00.6 ENCOUNTER FOR EXAMINATION FOR NORMAL COMPARISON OR CONTROL IN CLINICAL RESEARCH PROGRAM: ICD-10-CM

## 2024-06-25 ENCOUNTER — APPOINTMENT (OUTPATIENT)
Dept: LAB | Facility: CLINIC | Age: 79
End: 2024-06-25
Payer: MEDICARE

## 2024-06-25 DIAGNOSIS — Z00.6 ENCOUNTER FOR EXAMINATION FOR NORMAL COMPARISON OR CONTROL IN CLINICAL RESEARCH PROGRAM: ICD-10-CM

## 2024-06-25 DIAGNOSIS — L28.2 PAPULAR URTICARIA: ICD-10-CM

## 2024-06-25 DIAGNOSIS — R20.0 NUMBNESS OF FEET: ICD-10-CM

## 2024-06-25 DIAGNOSIS — I42.0 CONGESTIVE CARDIOMYOPATHY (HCC): ICD-10-CM

## 2024-06-25 DIAGNOSIS — R53.83 OTHER FATIGUE: ICD-10-CM

## 2024-06-25 DIAGNOSIS — E03.9 HYPOTHYROIDISM, ADULT: ICD-10-CM

## 2024-06-25 DIAGNOSIS — I48.91 ATRIAL FIBRILLATION, UNSPECIFIED TYPE (HCC): ICD-10-CM

## 2024-06-25 DIAGNOSIS — E78.00 PURE HYPERCHOLESTEROLEMIA: ICD-10-CM

## 2024-06-25 LAB
25(OH)D3 SERPL-MCNC: 34.3 NG/ML (ref 30–100)
ALBUMIN SERPL BCG-MCNC: 3.7 G/DL (ref 3.5–5)
ALP SERPL-CCNC: 58 U/L (ref 34–104)
ALT SERPL W P-5'-P-CCNC: 12 U/L (ref 7–52)
ANION GAP SERPL CALCULATED.3IONS-SCNC: 5 MMOL/L (ref 4–13)
AST SERPL W P-5'-P-CCNC: 21 U/L (ref 13–39)
BASOPHILS # BLD AUTO: 0.05 THOUSANDS/ÂΜL (ref 0–0.1)
BASOPHILS NFR BLD AUTO: 1 % (ref 0–1)
BILIRUB SERPL-MCNC: 0.49 MG/DL (ref 0.2–1)
BUN SERPL-MCNC: 10 MG/DL (ref 5–25)
CALCIUM SERPL-MCNC: 8.8 MG/DL (ref 8.4–10.2)
CHLORIDE SERPL-SCNC: 107 MMOL/L (ref 96–108)
CHOLEST SERPL-MCNC: 129 MG/DL
CO2 SERPL-SCNC: 30 MMOL/L (ref 21–32)
CREAT SERPL-MCNC: 0.86 MG/DL (ref 0.6–1.3)
EOSINOPHIL # BLD AUTO: 0.15 THOUSAND/ÂΜL (ref 0–0.61)
EOSINOPHIL NFR BLD AUTO: 3 % (ref 0–6)
ERYTHROCYTE [DISTWIDTH] IN BLOOD BY AUTOMATED COUNT: 12.3 % (ref 11.6–15.1)
FOLATE SERPL-MCNC: >22.3 NG/ML
GFR SERPL CREATININE-BSD FRML MDRD: 64 ML/MIN/1.73SQ M
GLUCOSE P FAST SERPL-MCNC: 90 MG/DL (ref 65–99)
HCT VFR BLD AUTO: 39.3 % (ref 34.8–46.1)
HDLC SERPL-MCNC: 46 MG/DL
HGB BLD-MCNC: 12.3 G/DL (ref 11.5–15.4)
IMM GRANULOCYTES # BLD AUTO: 0.01 THOUSAND/UL (ref 0–0.2)
IMM GRANULOCYTES NFR BLD AUTO: 0 % (ref 0–2)
LDLC SERPL CALC-MCNC: 68 MG/DL (ref 0–100)
LYMPHOCYTES # BLD AUTO: 0.96 THOUSANDS/ÂΜL (ref 0.6–4.47)
LYMPHOCYTES NFR BLD AUTO: 18 % (ref 14–44)
MCH RBC QN AUTO: 30.7 PG (ref 26.8–34.3)
MCHC RBC AUTO-ENTMCNC: 31.3 G/DL (ref 31.4–37.4)
MCV RBC AUTO: 98 FL (ref 82–98)
MONOCYTES # BLD AUTO: 0.44 THOUSAND/ÂΜL (ref 0.17–1.22)
MONOCYTES NFR BLD AUTO: 8 % (ref 4–12)
NEUTROPHILS # BLD AUTO: 3.77 THOUSANDS/ÂΜL (ref 1.85–7.62)
NEUTS SEG NFR BLD AUTO: 70 % (ref 43–75)
NRBC BLD AUTO-RTO: 0 /100 WBCS
PLATELET # BLD AUTO: 156 THOUSANDS/UL (ref 149–390)
PMV BLD AUTO: 11.2 FL (ref 8.9–12.7)
POTASSIUM SERPL-SCNC: 3.7 MMOL/L (ref 3.5–5.3)
PROT SERPL-MCNC: 6.1 G/DL (ref 6.4–8.4)
RBC # BLD AUTO: 4.01 MILLION/UL (ref 3.81–5.12)
SODIUM SERPL-SCNC: 142 MMOL/L (ref 135–147)
TRIGL SERPL-MCNC: 75 MG/DL
TSH SERPL DL<=0.05 MIU/L-ACNC: 2.18 UIU/ML (ref 0.45–4.5)
VIT B12 SERPL-MCNC: 338 PG/ML (ref 180–914)
WBC # BLD AUTO: 5.38 THOUSAND/UL (ref 4.31–10.16)

## 2024-06-25 PROCEDURE — 82746 ASSAY OF FOLIC ACID SERUM: CPT

## 2024-06-25 PROCEDURE — 82306 VITAMIN D 25 HYDROXY: CPT

## 2024-06-25 PROCEDURE — 36415 COLL VENOUS BLD VENIPUNCTURE: CPT

## 2024-06-25 PROCEDURE — 85025 COMPLETE CBC W/AUTO DIFF WBC: CPT

## 2024-06-25 PROCEDURE — 84443 ASSAY THYROID STIM HORMONE: CPT

## 2024-06-25 PROCEDURE — 80061 LIPID PANEL: CPT

## 2024-06-25 PROCEDURE — 82607 VITAMIN B-12: CPT

## 2024-06-25 PROCEDURE — 80053 COMPREHEN METABOLIC PANEL: CPT

## 2024-07-01 ENCOUNTER — TELEPHONE (OUTPATIENT)
Dept: CARDIOLOGY CLINIC | Facility: CLINIC | Age: 79
End: 2024-07-01

## 2024-07-01 NOTE — TELEPHONE ENCOUNTER
called patient to try to schedule an overdue follow up missed appt in Jan, she states she will call the office when ready to schedule an overdue follow up with Dr. Moralez.

## 2024-07-06 LAB
APOB+LDLR+PCSK9 GENE MUT ANL BLD/T: NOT DETECTED
BRCA1+BRCA2 DEL+DUP + FULL MUT ANL BLD/T: NOT DETECTED
MLH1+MSH2+MSH6+PMS2 GN DEL+DUP+FUL M: NOT DETECTED

## 2024-07-18 ENCOUNTER — NEW PATIENT COMPREHENSIVE (OUTPATIENT)
Dept: URBAN - METROPOLITAN AREA CLINIC 6 | Facility: CLINIC | Age: 79
End: 2024-07-18

## 2024-07-18 DIAGNOSIS — H35.413: ICD-10-CM

## 2024-07-18 DIAGNOSIS — H17.9: ICD-10-CM

## 2024-07-18 DIAGNOSIS — H25.813: ICD-10-CM

## 2024-07-18 DIAGNOSIS — H35.363: ICD-10-CM

## 2024-07-18 DIAGNOSIS — H02.834: ICD-10-CM

## 2024-07-18 DIAGNOSIS — H02.831: ICD-10-CM

## 2024-07-18 DIAGNOSIS — H43.813: ICD-10-CM

## 2024-07-18 PROCEDURE — 99204 OFFICE O/P NEW MOD 45 MIN: CPT

## 2024-07-18 ASSESSMENT — VISUAL ACUITY
OS_PH: 20/40
OS_SC: 20/60+1
OD_PH: 20/60
OD_SC: 20/150

## 2024-07-18 ASSESSMENT — TONOMETRY
OD_IOP_MMHG: 15
OS_IOP_MMHG: 15

## 2024-07-30 ENCOUNTER — APPOINTMENT (OUTPATIENT)
Dept: LAB | Facility: CLINIC | Age: 79
End: 2024-07-30
Payer: MEDICARE

## 2024-07-30 LAB
BACTERIA UR QL AUTO: ABNORMAL /HPF
BILIRUB UR QL STRIP: NEGATIVE
CLARITY UR: CLEAR
COLOR UR: ABNORMAL
GLUCOSE UR STRIP-MCNC: NEGATIVE MG/DL
HGB UR QL STRIP.AUTO: NEGATIVE
KETONES UR STRIP-MCNC: NEGATIVE MG/DL
LEUKOCYTE ESTERASE UR QL STRIP: ABNORMAL
NITRITE UR QL STRIP: NEGATIVE
NON-SQ EPI CELLS URNS QL MICRO: ABNORMAL /HPF
PH UR STRIP.AUTO: 6 [PH]
PROT UR STRIP-MCNC: NEGATIVE MG/DL
RBC #/AREA URNS AUTO: ABNORMAL /HPF
SP GR UR STRIP.AUTO: 1.01 (ref 1–1.03)
UROBILINOGEN UR STRIP-ACNC: <2 MG/DL
WBC #/AREA URNS AUTO: ABNORMAL /HPF

## 2024-07-30 PROCEDURE — 81001 URINALYSIS AUTO W/SCOPE: CPT

## 2024-08-05 ENCOUNTER — HOSPITAL ENCOUNTER (OUTPATIENT)
Dept: RADIOLOGY | Facility: HOSPITAL | Age: 79
Discharge: HOME | End: 2024-08-05
Payer: MEDICARE

## 2024-08-05 DIAGNOSIS — M48.062 SPINAL STENOSIS OF LUMBAR REGION WITH NEUROGENIC CLAUDICATION: ICD-10-CM

## 2024-08-05 PROCEDURE — 72148 MRI LUMBAR SPINE W/O DYE: CPT

## 2024-08-07 ENCOUNTER — PRE-OP CATARACT MEASUREMENTS (OUTPATIENT)
Dept: URBAN - METROPOLITAN AREA CLINIC 6 | Facility: CLINIC | Age: 79
End: 2024-08-07

## 2024-08-07 ASSESSMENT — KERATOMETRY
OS_K2POWER_DIOPTERS: 45.00
OD_AXISANGLE2_DEGREES: 151
OD_AXISANGLE_DEGREES: 061
OS_K1POWER_DIOPTERS: 44.00
OD_K1POWER_DIOPTERS: 43.50
OD_K2POWER_DIOPTERS: 44.00
OS_AXISANGLE_DEGREES: 103
OS_AXISANGLE2_DEGREES: 13

## 2024-08-07 ASSESSMENT — VISUAL ACUITY
OD_SC: 20/100-1
OS_SC: 20/50+2

## 2024-08-07 ASSESSMENT — TONOMETRY
OS_IOP_MMHG: 14
OD_IOP_MMHG: 15

## 2024-08-11 DIAGNOSIS — I10 ESSENTIAL (PRIMARY) HYPERTENSION: ICD-10-CM

## 2024-08-12 RX ORDER — LEVOTHYROXINE SODIUM 100 UG/1
100 TABLET ORAL DAILY
Qty: 90 TABLET | Refills: 0 | Status: SHIPPED | OUTPATIENT
Start: 2024-08-12

## 2024-08-13 ENCOUNTER — OFFICE VISIT (OUTPATIENT)
Dept: CARDIOLOGY CLINIC | Facility: CLINIC | Age: 79
End: 2024-08-13
Payer: MEDICARE

## 2024-08-13 VITALS
DIASTOLIC BLOOD PRESSURE: 72 MMHG | SYSTOLIC BLOOD PRESSURE: 136 MMHG | OXYGEN SATURATION: 98 % | BODY MASS INDEX: 20.14 KG/M2 | HEIGHT: 69 IN | HEART RATE: 59 BPM | WEIGHT: 136 LBS

## 2024-08-13 DIAGNOSIS — I48.0 PAROXYSMAL ATRIAL FIBRILLATION (HCC): ICD-10-CM

## 2024-08-13 DIAGNOSIS — E78.2 MIXED HYPERLIPIDEMIA: ICD-10-CM

## 2024-08-13 DIAGNOSIS — Z01.810 PREOP CARDIOVASCULAR EXAM: ICD-10-CM

## 2024-08-13 DIAGNOSIS — I42.9 CARDIOMYOPATHY, UNSPECIFIED TYPE (HCC): Primary | ICD-10-CM

## 2024-08-13 PROCEDURE — 99213 OFFICE O/P EST LOW 20 MIN: CPT | Performed by: INTERNAL MEDICINE

## 2024-08-13 PROCEDURE — 93000 ELECTROCARDIOGRAM COMPLETE: CPT | Performed by: INTERNAL MEDICINE

## 2024-08-13 NOTE — ASSESSMENT & PLAN NOTE
The patient is advised that she is stable from the cardiac standpoint to proceed with the planned surgery.

## 2024-08-13 NOTE — PROGRESS NOTES
Subjective:        Patient ID: Mireya Patel is a 78 y.o. female.    Chief Complaint:  The patient presented to this office for the purpose of cardiac follow-up and for presurgical evaluation in anticipation of cataract surgery.  The patient is known to have a history of paroxysmal atrial fibrillation associated with tachycardia related cardiomyopathy that has since resolved.  The patient also has a history of hyperlipidemia.  The patient is complaining of fatigue but denies any symptom of chest pain or shortness of breath.  She denies any symptoms of palpitation, dizziness or syncope.  She has no leg edema.      The following portions of the patient's history were reviewed and updated as appropriate: allergies, current medications, past family history, past medical history, past social history, past surgical history, and problem list.  Review of Systems   Constitutional: Negative.   Cardiovascular: Negative.    Respiratory: Negative.     Psychiatric/Behavioral: Negative.            Objective:     Physical Exam  Constitutional:       Appearance: Normal appearance.   HENT:      Head: Normocephalic and atraumatic.   Cardiovascular:      Rate and Rhythm: Regular rhythm. Bradycardia present.      Heart sounds: Normal heart sounds.   Pulmonary:      Effort: Pulmonary effort is normal.      Breath sounds: Normal breath sounds.   Musculoskeletal:      Right lower leg: No edema.      Left lower leg: No edema.   Skin:     General: Skin is warm and dry.   Neurological:      Mental Status: She is alert and oriented to person, place, and time.   Psychiatric:         Mood and Affect: Mood normal.         Behavior: Behavior normal.         Lab Review:   Lab Results   Component Value Date    K 3.7 06/25/2024    K 4.3 01/29/2021     06/25/2024     01/29/2021    CO2 30 06/25/2024    CO2 28 01/29/2021    BUN 10 06/25/2024    BUN 16 01/29/2021    CREATININE 0.86 06/25/2024    CALCIUM 8.8 06/25/2024    CALCIUM 9.2  01/29/2021         Assessment:       1. Cardiomyopathy, unspecified type (HCC)  POCT ECG      2. Paroxysmal atrial fibrillation (HCC)        3. Mixed hyperlipidemia        4. Preop cardiovascular exam             Plan:       The patient is advised to continue present regimen.  She is also advised that she is stable from the cardiac standpoint to proceed with cataract surgery.

## 2024-08-13 NOTE — ASSESSMENT & PLAN NOTE
The patient has a history of cardiomyopathy that is presumed to be tachycardia related in the context of uncontrolled atrial fibrillation.  This has resolved and the patient's ejection fraction at this point is normal.

## 2024-08-16 ENCOUNTER — TELEPHONE (OUTPATIENT)
Dept: PAIN MEDICINE | Facility: CLINIC | Age: 79
End: 2024-08-16

## 2024-08-16 NOTE — TELEPHONE ENCOUNTER
Pt would like to request IRMA from Dr Neil to Dr Chapman.   is a current pt of Dr Chapman and she would like to be seeing here also. Please advise

## 2024-08-22 ENCOUNTER — SURGERY/PROCEDURE (OUTPATIENT)
Dept: URBAN - METROPOLITAN AREA SURGICAL CENTER 6 | Facility: SURGICAL CENTER | Age: 79
End: 2024-08-22

## 2024-08-22 DIAGNOSIS — H25.811: ICD-10-CM

## 2024-08-22 PROCEDURE — 66984 XCAPSL CTRC RMVL W/O ECP: CPT

## 2024-08-22 PROCEDURE — MISCMFIOL MISCMFIOL

## 2024-08-22 PROCEDURE — 68841 INSJ RX ELUT IMPLT LAC CANAL: CPT

## 2024-08-22 PROCEDURE — MISCFEMTO FEMTO

## 2024-08-23 ENCOUNTER — 1 DAY POST-OP (OUTPATIENT)
Dept: URBAN - METROPOLITAN AREA CLINIC 6 | Facility: CLINIC | Age: 79
End: 2024-08-23

## 2024-08-23 DIAGNOSIS — Z96.1: ICD-10-CM

## 2024-08-23 PROCEDURE — 99024 POSTOP FOLLOW-UP VISIT: CPT

## 2024-08-23 ASSESSMENT — KERATOMETRY
OS_K1POWER_DIOPTERS: 44.00
OD_AXISANGLE2_DEGREES: 151
OS_AXISANGLE_DEGREES: 103
OS_K1POWER_DIOPTERS: 44.00
OS_AXISANGLE2_DEGREES: 13
OS_AXISANGLE_DEGREES: 103
OD_K2POWER_DIOPTERS: 44.00
OS_K2POWER_DIOPTERS: 45.00
OD_AXISANGLE_DEGREES: 061
OS_K2POWER_DIOPTERS: 45.00
OD_K1POWER_DIOPTERS: 43.50
OD_K2POWER_DIOPTERS: 44.00
OS_AXISANGLE2_DEGREES: 13
OD_AXISANGLE2_DEGREES: 151
OD_K1POWER_DIOPTERS: 43.50
OD_AXISANGLE_DEGREES: 061

## 2024-08-23 ASSESSMENT — TONOMETRY
OD_IOP_MMHG: 25
OS_IOP_MMHG: 15

## 2024-08-23 ASSESSMENT — VISUAL ACUITY
OS_SC: 20/50
OD_SC: 20/40-1

## 2024-08-29 ENCOUNTER — 1 WEEK POST-OP (OUTPATIENT)
Dept: URBAN - METROPOLITAN AREA CLINIC 6 | Facility: CLINIC | Age: 79
End: 2024-08-29

## 2024-08-29 DIAGNOSIS — Z96.1: ICD-10-CM

## 2024-08-29 DIAGNOSIS — H25.812: ICD-10-CM

## 2024-08-29 PROCEDURE — 92136 OPHTHALMIC BIOMETRY: CPT | Mod: 26,LT

## 2024-08-29 PROCEDURE — 99024 POSTOP FOLLOW-UP VISIT: CPT

## 2024-08-29 ASSESSMENT — TONOMETRY
OS_IOP_MMHG: 13
OD_IOP_MMHG: 15

## 2024-08-29 ASSESSMENT — KERATOMETRY
OD_AXISANGLE2_DEGREES: 151
OD_K1POWER_DIOPTERS: 43.50
OD_AXISANGLE_DEGREES: 061
OD_K2POWER_DIOPTERS: 44.00
OS_K2POWER_DIOPTERS: 45.00
OS_AXISANGLE2_DEGREES: 13
OS_K1POWER_DIOPTERS: 44.00
OS_AXISANGLE_DEGREES: 103

## 2024-08-29 ASSESSMENT — VISUAL ACUITY
OD_SC: 20/20
OS_SC: 20/50-2
OD_SC: 20/30

## 2024-09-11 ENCOUNTER — 1 DAY POST-OP (OUTPATIENT)
Dept: URBAN - METROPOLITAN AREA CLINIC 6 | Facility: CLINIC | Age: 79
End: 2024-09-11

## 2024-09-11 DIAGNOSIS — Z96.1: ICD-10-CM

## 2024-09-11 PROCEDURE — 99024 POSTOP FOLLOW-UP VISIT: CPT

## 2024-09-11 ASSESSMENT — KERATOMETRY
OS_K2POWER_DIOPTERS: 45.00
OD_K1POWER_DIOPTERS: 43.50
OD_K2POWER_DIOPTERS: 44.00
OD_AXISANGLE_DEGREES: 061
OS_AXISANGLE_DEGREES: 103
OS_K1POWER_DIOPTERS: 44.00
OS_AXISANGLE2_DEGREES: 13
OD_AXISANGLE2_DEGREES: 151

## 2024-09-11 ASSESSMENT — TONOMETRY
OD_IOP_MMHG: 11
OS_IOP_MMHG: 27
OS_IOP_MMHG: 29

## 2024-09-11 ASSESSMENT — VISUAL ACUITY
OD_SC: 20/40
OS_SC: 20/40
OU_SC: 20/20

## 2024-09-19 ENCOUNTER — 1 WEEK POST-OP (OUTPATIENT)
Dept: URBAN - METROPOLITAN AREA CLINIC 6 | Facility: CLINIC | Age: 79
End: 2024-09-19

## 2024-09-19 DIAGNOSIS — Z96.1: ICD-10-CM

## 2024-09-19 PROCEDURE — 99024 POSTOP FOLLOW-UP VISIT: CPT

## 2024-09-19 ASSESSMENT — KERATOMETRY
OD_AXISANGLE_DEGREES: 061
OS_AXISANGLE_DEGREES: 103
OS_K1POWER_DIOPTERS: 44.00
OD_AXISANGLE2_DEGREES: 151
OS_AXISANGLE2_DEGREES: 13
OD_K2POWER_DIOPTERS: 44.00
OD_K1POWER_DIOPTERS: 43.50
OS_K2POWER_DIOPTERS: 45.00

## 2024-09-19 ASSESSMENT — VISUAL ACUITY
OD_SC: J2
OU_SC: J1+
OS_SC: J1
OD_SC: 20/50
OS_SC: 20/50

## 2024-09-19 ASSESSMENT — TONOMETRY
OS_IOP_MMHG: 10
OD_IOP_MMHG: 10

## 2024-10-04 DIAGNOSIS — E78.2 MIXED HYPERLIPIDEMIA: ICD-10-CM

## 2024-10-04 RX ORDER — PITAVASTATIN CALCIUM 2.09 MG/1
2 TABLET, FILM COATED ORAL DAILY
Qty: 90 TABLET | Refills: 1 | Status: SHIPPED | OUTPATIENT
Start: 2024-10-04

## 2024-10-24 ENCOUNTER — 1 MONTH POST-OP (OUTPATIENT)
Dept: URBAN - METROPOLITAN AREA CLINIC 6 | Facility: CLINIC | Age: 79
End: 2024-10-24

## 2024-10-24 DIAGNOSIS — Z96.1: ICD-10-CM

## 2024-10-24 PROCEDURE — 99024 POSTOP FOLLOW-UP VISIT: CPT

## 2024-10-24 ASSESSMENT — KERATOMETRY
OS_K1POWER_DIOPTERS: 44.00
OD_AXISANGLE2_DEGREES: 151
OS_K2POWER_DIOPTERS: 45.00
OS_AXISANGLE2_DEGREES: 13
OD_K2POWER_DIOPTERS: 44.00
OS_AXISANGLE_DEGREES: 103
OD_AXISANGLE_DEGREES: 061
OD_K1POWER_DIOPTERS: 43.50

## 2024-10-24 ASSESSMENT — VISUAL ACUITY
OS_SC: 20/40
OD_SC: 20/40
OU_SC: J1

## 2024-10-24 ASSESSMENT — TONOMETRY
OD_IOP_MMHG: 11
OS_IOP_MMHG: 9

## 2024-11-06 NOTE — PROGRESS NOTES
Problem: Stroke: Ischemic (Transient/Permanent)  Goal: Neurological status is maintained/restored to status at baseline  Description: Monitor neurological and mental status including symptoms of increasing intracranial pressure (headache, nausea/vomiting, or change in behavior). Hypertension (greater than 180 systolic) may also indicate a change in status related to stroke.  Outcome: Monitoring/Evaluating progress  Goal: Normal temperature is maintained  Outcome: Monitoring/Evaluating progress  Goal: Elimination status is maintained/returned to baseline  Description: Remove indwelling urinary catheter as soon as possible or collaborate with provider for order/reason for continued use.   Outcome: Monitoring/Evaluating progress  Goal: #Depressive s/s (self-reported/observed) are recognized and monitored  Outcome: Monitoring/Evaluating progress  Goal: Personal stroke risk factors are identified with initial plan for risk reduction  Description: Stroke risk reduction involves taking medication, changing diet, increasing physical exercise, smoking cessation, or alcohol/drug use reduction/cessation based on identified risks.  Outcome: Monitoring/Evaluating progress  Goal: Verbalizes understanding of condition and treatment plan  Description: Document on Patient Education Activity  Outcome: Monitoring/Evaluating progress      Assessment/Plan:    Cardiomyopathy ([facility] )  A history of dilated cardiomyopathy, stable  Patient's latest ejection fraction had normalized  She is asymptomatic in this regard with no symptoms of dyspnea or any evidence of leg edema  We will continue observation and current medications  Paroxysmal atrial fibrillation (HCC)  History of paroxysmal atrial fibrillation, stable  The patient is in regular rhythm  Mixed hyperlipidemia  Hyperlipidemia, stable  The patient will continue Livalo at 2 mg daily  Anxiety and depression  History of anxiety and depression, less than optimally controlled  The patient will continue centrally at 100 mg daily  The patient may benefit from psychiatric evaluation to explore other options  Diagnoses and all orders for this visit:    Cardiomyopathy, unspecified type ([facility] )    Paroxysmal atrial fibrillation (HCC)    Mixed hyperlipidemia    Anxiety and depression          Subjective:  Easy fatigability  Patient ID: Jackelyn Hernadez is a 66 y o  female  Patient presented to this office for the purpose of cardiac follow-up  She has a known history of cardiomyopathy paroxysmal atrial fibrillation  She has been feeling tired and somewhat depressed  She denies however any symptoms of chest pain, shortness of breath palpitation, dizziness or lightheadedness  She has no leg edema  The following portions of the patient's history were reviewed and updated as appropriate: allergies, current medications, past family history, past medical history, past social history, past surgical history and problem list     Review of Systems   Respiratory: Negative for apnea, cough, chest tightness, shortness of breath and wheezing  Cardiovascular: Negative for chest pain, palpitations and leg swelling  Gastrointestinal: Negative for abdominal pain  Neurological: Negative for dizziness and light-headedness  Hematological: Negative  Objective:  Stable cardiac-wise  Somewhat depressed  /70 (BP Location: Right arm, Patient Position: Sitting)   Pulse 65   Resp 18   Ht 5' 9" (1 753 m)   Wt 68 1 kg (150 lb 3 2 oz)   SpO2 97%   BMI 22 18 kg/m²          Physical Exam   Constitutional: She is oriented to person, place, and time  She appears well-developed and well-nourished  No distress  HENT:   Head: Normocephalic and atraumatic  Neck: Normal range of motion  Neck supple  No JVD present  No thyromegaly present  Cardiovascular: Normal rate, regular rhythm, S1 normal and S2 normal  Exam reveals no gallop and no friction rub  No murmur heard  Pulmonary/Chest: Effort normal  No respiratory distress  She has no wheezes  She has no rales  She exhibits no tenderness  Abdominal: Soft  Musculoskeletal: She exhibits no edema  Neurological: She is alert and oriented to person, place, and time  Skin: Skin is warm and dry  She is not diaphoretic  Psychiatric: She has a normal mood and affect  Vitals reviewed

## 2024-11-11 DIAGNOSIS — I10 ESSENTIAL (PRIMARY) HYPERTENSION: ICD-10-CM

## 2024-11-12 RX ORDER — LEVOTHYROXINE SODIUM 100 UG/1
100 TABLET ORAL DAILY
Qty: 90 TABLET | Refills: 1 | Status: SHIPPED | OUTPATIENT
Start: 2024-11-12

## 2025-01-02 DIAGNOSIS — I10 ESSENTIAL (PRIMARY) HYPERTENSION: ICD-10-CM

## 2025-01-02 DIAGNOSIS — E78.2 MIXED HYPERLIPIDEMIA: ICD-10-CM

## 2025-01-02 DIAGNOSIS — F41.9 ANXIETY: ICD-10-CM

## 2025-01-03 RX ORDER — EZETIMIBE 10 MG/1
10 TABLET ORAL DAILY
Qty: 90 TABLET | Refills: 1 | Status: SHIPPED | OUTPATIENT
Start: 2025-01-03

## 2025-01-03 RX ORDER — CARVEDILOL 12.5 MG/1
12.5 TABLET ORAL 2 TIMES DAILY
Qty: 180 TABLET | Refills: 1 | Status: SHIPPED | OUTPATIENT
Start: 2025-01-03

## 2025-01-03 RX ORDER — SERTRALINE HYDROCHLORIDE 100 MG/1
100 TABLET, FILM COATED ORAL DAILY
Qty: 90 TABLET | Refills: 1 | Status: SHIPPED | OUTPATIENT
Start: 2025-01-03

## 2025-02-03 ENCOUNTER — TRANSCRIBE ORDERS (OUTPATIENT)
Dept: LAB | Facility: CLINIC | Age: 80
End: 2025-02-03

## 2025-02-03 ENCOUNTER — APPOINTMENT (OUTPATIENT)
Dept: LAB | Facility: CLINIC | Age: 80
End: 2025-02-03
Payer: MEDICARE

## 2025-02-03 DIAGNOSIS — E03.9 MYXEDEMA HEART DISEASE: ICD-10-CM

## 2025-02-03 DIAGNOSIS — L28.2 PRURIGO: ICD-10-CM

## 2025-02-03 DIAGNOSIS — E78.00 PURE HYPERCHOLESTEROLEMIA: ICD-10-CM

## 2025-02-03 DIAGNOSIS — R20.0 TACTILE ANESTHESIA: ICD-10-CM

## 2025-02-03 DIAGNOSIS — I48.91 ATRIAL FIBRILLATION, UNSPECIFIED TYPE (HCC): ICD-10-CM

## 2025-02-03 DIAGNOSIS — R53.83 OTHER FATIGUE: ICD-10-CM

## 2025-02-03 DIAGNOSIS — I51.9 MYXEDEMA HEART DISEASE: ICD-10-CM

## 2025-02-03 DIAGNOSIS — E78.00 PURE HYPERCHOLESTEROLEMIA: Primary | ICD-10-CM

## 2025-02-03 LAB
25(OH)D3 SERPL-MCNC: 56.3 NG/ML (ref 30–100)
BACTERIA UR QL AUTO: ABNORMAL /HPF
BASOPHILS # BLD AUTO: 0.04 THOUSANDS/ΜL (ref 0–0.1)
BASOPHILS NFR BLD AUTO: 1 % (ref 0–1)
BILIRUB UR QL STRIP: NEGATIVE
CLARITY UR: CLEAR
COLOR UR: YELLOW
EOSINOPHIL # BLD AUTO: 0.17 THOUSAND/ΜL (ref 0–0.61)
EOSINOPHIL NFR BLD AUTO: 3 % (ref 0–6)
ERYTHROCYTE [DISTWIDTH] IN BLOOD BY AUTOMATED COUNT: 13.1 % (ref 11.6–15.1)
GLUCOSE UR STRIP-MCNC: NEGATIVE MG/DL
HCT VFR BLD AUTO: 40.9 % (ref 34.8–46.1)
HGB BLD-MCNC: 13 G/DL (ref 11.5–15.4)
HGB UR QL STRIP.AUTO: NEGATIVE
IMM GRANULOCYTES # BLD AUTO: 0.02 THOUSAND/UL (ref 0–0.2)
IMM GRANULOCYTES NFR BLD AUTO: 0 % (ref 0–2)
KETONES UR STRIP-MCNC: NEGATIVE MG/DL
LEUKOCYTE ESTERASE UR QL STRIP: NEGATIVE
LYMPHOCYTES # BLD AUTO: 1.67 THOUSANDS/ΜL (ref 0.6–4.47)
LYMPHOCYTES NFR BLD AUTO: 24 % (ref 14–44)
MCH RBC QN AUTO: 30.2 PG (ref 26.8–34.3)
MCHC RBC AUTO-ENTMCNC: 31.8 G/DL (ref 31.4–37.4)
MCV RBC AUTO: 95 FL (ref 82–98)
MONOCYTES # BLD AUTO: 0.59 THOUSAND/ΜL (ref 0.17–1.22)
MONOCYTES NFR BLD AUTO: 9 % (ref 4–12)
NEUTROPHILS # BLD AUTO: 4.41 THOUSANDS/ΜL (ref 1.85–7.62)
NEUTS SEG NFR BLD AUTO: 63 % (ref 43–75)
NITRITE UR QL STRIP: NEGATIVE
NON-SQ EPI CELLS URNS QL MICRO: ABNORMAL /HPF
NRBC BLD AUTO-RTO: 0 /100 WBCS
PH UR STRIP.AUTO: 7.5 [PH]
PLATELET # BLD AUTO: 134 THOUSANDS/UL (ref 149–390)
PMV BLD AUTO: 12.2 FL (ref 8.9–12.7)
PROT UR STRIP-MCNC: ABNORMAL MG/DL
RBC # BLD AUTO: 4.3 MILLION/UL (ref 3.81–5.12)
RBC #/AREA URNS AUTO: ABNORMAL /HPF
SP GR UR STRIP.AUTO: 1.02 (ref 1–1.03)
TSH SERPL DL<=0.05 MIU/L-ACNC: 2.37 UIU/ML (ref 0.45–4.5)
UROBILINOGEN UR STRIP-ACNC: <2 MG/DL
VIT B12 SERPL-MCNC: 389 PG/ML (ref 180–914)
WBC # BLD AUTO: 6.9 THOUSAND/UL (ref 4.31–10.16)
WBC #/AREA URNS AUTO: ABNORMAL /HPF

## 2025-02-03 PROCEDURE — 84443 ASSAY THYROID STIM HORMONE: CPT

## 2025-02-03 PROCEDURE — 36415 COLL VENOUS BLD VENIPUNCTURE: CPT

## 2025-02-03 PROCEDURE — 82306 VITAMIN D 25 HYDROXY: CPT

## 2025-02-03 PROCEDURE — 82607 VITAMIN B-12: CPT

## 2025-02-03 PROCEDURE — 81001 URINALYSIS AUTO W/SCOPE: CPT

## 2025-02-03 PROCEDURE — 80061 LIPID PANEL: CPT

## 2025-02-03 PROCEDURE — 85025 COMPLETE CBC W/AUTO DIFF WBC: CPT

## 2025-02-03 PROCEDURE — 80053 COMPREHEN METABOLIC PANEL: CPT

## 2025-02-04 LAB
ALBUMIN SERPL BCG-MCNC: 4.4 G/DL (ref 3.5–5)
ALP SERPL-CCNC: 66 U/L (ref 34–104)
ALT SERPL W P-5'-P-CCNC: 13 U/L (ref 7–52)
ANION GAP SERPL CALCULATED.3IONS-SCNC: 8 MMOL/L (ref 4–13)
AST SERPL W P-5'-P-CCNC: 26 U/L (ref 13–39)
BILIRUB SERPL-MCNC: 0.62 MG/DL (ref 0.2–1)
BUN SERPL-MCNC: 15 MG/DL (ref 5–25)
CALCIUM SERPL-MCNC: 9.4 MG/DL (ref 8.4–10.2)
CHLORIDE SERPL-SCNC: 104 MMOL/L (ref 96–108)
CHOLEST SERPL-MCNC: 221 MG/DL (ref ?–200)
CO2 SERPL-SCNC: 29 MMOL/L (ref 21–32)
CREAT SERPL-MCNC: 0.97 MG/DL (ref 0.6–1.3)
GFR SERPL CREATININE-BSD FRML MDRD: 54 ML/MIN/1.73SQ M
GLUCOSE P FAST SERPL-MCNC: 81 MG/DL (ref 65–99)
HDLC SERPL-MCNC: 55 MG/DL
LDLC SERPL CALC-MCNC: 149 MG/DL (ref 0–100)
POTASSIUM SERPL-SCNC: 4.6 MMOL/L (ref 3.5–5.3)
PROT SERPL-MCNC: 6.9 G/DL (ref 6.4–8.4)
SODIUM SERPL-SCNC: 141 MMOL/L (ref 135–147)
TRIGL SERPL-MCNC: 83 MG/DL (ref ?–150)

## 2025-04-02 DIAGNOSIS — E78.2 MIXED HYPERLIPIDEMIA: ICD-10-CM

## 2025-04-02 RX ORDER — PITAVASTATIN CALCIUM 2.09 MG/1
2 TABLET, FILM COATED ORAL DAILY
Qty: 90 TABLET | Refills: 3 | Status: SHIPPED | OUTPATIENT
Start: 2025-04-02

## 2025-04-27 NOTE — PATIENT INSTRUCTIONS
The patient is advised to continue present medication.  She is stable to proceed with cataract surgery.   Fall Risk

## 2025-05-09 DIAGNOSIS — I10 ESSENTIAL (PRIMARY) HYPERTENSION: ICD-10-CM

## 2025-05-10 RX ORDER — LEVOTHYROXINE SODIUM 100 UG/1
100 TABLET ORAL DAILY
Qty: 90 TABLET | Refills: 3 | Status: SHIPPED | OUTPATIENT
Start: 2025-05-10

## 2025-07-01 DIAGNOSIS — F41.9 ANXIETY: ICD-10-CM

## 2025-07-01 DIAGNOSIS — E78.2 MIXED HYPERLIPIDEMIA: ICD-10-CM

## 2025-07-01 DIAGNOSIS — I10 ESSENTIAL (PRIMARY) HYPERTENSION: ICD-10-CM

## 2025-07-01 RX ORDER — SERTRALINE HYDROCHLORIDE 100 MG/1
100 TABLET, FILM COATED ORAL DAILY
Qty: 90 TABLET | Refills: 0 | Status: SHIPPED | OUTPATIENT
Start: 2025-07-01

## 2025-07-01 RX ORDER — CARVEDILOL 12.5 MG/1
12.5 TABLET ORAL 2 TIMES DAILY
Qty: 180 TABLET | Refills: 0 | Status: SHIPPED | OUTPATIENT
Start: 2025-07-01

## 2025-07-01 RX ORDER — EZETIMIBE 10 MG/1
10 TABLET ORAL DAILY
Qty: 90 TABLET | Refills: 0 | Status: SHIPPED | OUTPATIENT
Start: 2025-07-01

## 2025-07-08 ENCOUNTER — OFFICE VISIT (OUTPATIENT)
Dept: CARDIOLOGY CLINIC | Facility: CLINIC | Age: 80
End: 2025-07-08
Payer: MEDICARE

## 2025-07-08 VITALS
SYSTOLIC BLOOD PRESSURE: 116 MMHG | WEIGHT: 145.2 LBS | HEART RATE: 60 BPM | BODY MASS INDEX: 21.51 KG/M2 | DIASTOLIC BLOOD PRESSURE: 60 MMHG | HEIGHT: 69 IN | OXYGEN SATURATION: 92 %

## 2025-07-08 DIAGNOSIS — E78.2 MIXED HYPERLIPIDEMIA: ICD-10-CM

## 2025-07-08 DIAGNOSIS — I48.0 PAROXYSMAL ATRIAL FIBRILLATION (HCC): ICD-10-CM

## 2025-07-08 DIAGNOSIS — F41.9 ANXIETY AND DEPRESSION: ICD-10-CM

## 2025-07-08 DIAGNOSIS — F32.A ANXIETY AND DEPRESSION: ICD-10-CM

## 2025-07-08 DIAGNOSIS — I42.9 CARDIOMYOPATHY, UNSPECIFIED TYPE (HCC): Primary | ICD-10-CM

## 2025-07-08 PROCEDURE — 93000 ELECTROCARDIOGRAM COMPLETE: CPT | Performed by: INTERNAL MEDICINE

## 2025-07-08 PROCEDURE — 99214 OFFICE O/P EST MOD 30 MIN: CPT | Performed by: INTERNAL MEDICINE

## 2025-07-08 RX ORDER — AMITRIPTYLINE HYDROCHLORIDE 10 MG/1
10-20 TABLET ORAL
COMMUNITY
Start: 2025-06-03

## 2025-07-08 NOTE — ASSESSMENT & PLAN NOTE
Anxiety and depression, not adequately controlled with periods of panic attacks present.  Orders:    POCT ECG    Echo complete w/ contrast if indicated; Future    Holter monitor; Future    NM myocardial perfusion spect (rx stress and/or rest); Future

## 2025-07-08 NOTE — ASSESSMENT & PLAN NOTE
Paroxysmal atrial fibrillation, stable.  The patient is in regular rhythm with PVCs.  Will continue present medications.  Orders:    POCT ECG    Echo complete w/ contrast if indicated; Future    Holter monitor; Future    NM myocardial perfusion spect (rx stress and/or rest); Future

## 2025-07-08 NOTE — ASSESSMENT & PLAN NOTE
History of cardiomyopathy related to tachycardia associated with atrial fibrillation.  This has resolved.  At this point the patient exhibits EKG changes with poor R wave progression and evidence of premature ventricular contractions.  I will therefore arrange for the patient to have a 24-hour Holter as well as an echocardiogram and pharmacologic nuclear stress test.  The patient is unable to walk on the treadmill because of arthritic condition.  Orders:    POCT ECG    Echo complete w/ contrast if indicated; Future    Holter monitor; Future    NM myocardial perfusion spect (rx stress and/or rest); Future

## 2025-07-08 NOTE — ASSESSMENT & PLAN NOTE
Hyperlipidemia, stable.  The patient will continue Zetia and Livalo.  Orders:    POCT ECG    Echo complete w/ contrast if indicated; Future    Holter monitor; Future    NM myocardial perfusion spect (rx stress and/or rest); Future

## 2025-07-08 NOTE — PATIENT INSTRUCTIONS
The patient is advised to continue present regimen.  She will be scheduled for echocardiogram, 24-hour Holter and pharmacologic nuclear stress test.

## 2025-07-08 NOTE — PROGRESS NOTES
Name: Mireya Patel      : 1945      MRN: [id number]  Encounter Provider: Davina Moralez MD  Encounter Date: 2025   Encounter department: [facility] [place] [facility]  :  Assessment & Plan  Cardiomyopathy, unspecified type (HCC)  History of cardiomyopathy related to tachycardia associated with atrial fibrillation.  This has resolved.  At this point the patient exhibits EKG changes with poor R wave progression and evidence of premature ventricular contractions.  I will therefore arrange for the patient to have a 24-hour Holter as well as an echocardiogram and pharmacologic nuclear stress test.  The patient is unable to walk on the treadmill because of arthritic condition.  Orders:    POCT ECG    Echo complete w/ contrast if indicated; Future    Holter monitor; Future    NM myocardial perfusion spect (rx stress and/or rest); Future    Paroxysmal atrial fibrillation (HCC)  Paroxysmal atrial fibrillation, stable.  The patient is in regular rhythm with PVCs.  Will continue present medications.  Orders:    POCT ECG    Echo complete w/ contrast if indicated; Future    Holter monitor; Future    NM myocardial perfusion spect (rx stress and/or rest); Future    Anxiety and depression  Anxiety and depression, not adequately controlled with periods of panic attacks present.  Orders:    POCT ECG    Echo complete w/ contrast if indicated; Future    Holter monitor; Future    NM myocardial perfusion spect (rx stress and/or rest); Future    Mixed hyperlipidemia  Hyperlipidemia, stable.  The patient will continue Zetia and Livalo.  Orders:    POCT ECG    Echo complete w/ contrast if indicated; Future    Holter monitor; Future    NM myocardial perfusion spect (rx stress and/or rest); Future        History of Present Illness   The patient presented to this office for the purpose of cardiac follow-up.  She is known to have a history of cardiomyopathy related to tachycardia associated with atrial  "fibrillation.  The patient is also being treated for anxiety and depression.  She continues to experience bouts of increased anxiety associated with panic attack manifested with chest tightness and some racing in the heart.  She describes burning sensation in her legs at night consistent with history of neuropathy.  She feels tired.      Mireya Patel is a 79 y.o. female   History obtained from: patient    Review of Systems   Constitutional:  Positive for fatigue.   Respiratory: Negative.     Cardiovascular:  Positive for chest pain and palpitations. Negative for leg swelling.   Psychiatric/Behavioral:  The patient is nervous/anxious.           Objective   /60 (BP Location: Left arm, Patient Position: Sitting, Cuff Size: Standard)   Pulse 60   Ht 5' 9\" (1.753 m)   Wt 65.9 kg (145 lb 3.2 oz)   SpO2 92%   BMI 21.44 kg/m²      Physical Exam  Vitals reviewed.   Constitutional:       Appearance: She is well-developed.   HENT:      Head: Normocephalic and atraumatic.     Cardiovascular:      Rate and Rhythm: Normal rate and regular rhythm.   Pulmonary:      Effort: Pulmonary effort is normal.      Breath sounds: Normal breath sounds.     Musculoskeletal:      Right lower leg: No edema.      Left lower leg: No edema.     Skin:     General: Skin is warm and dry.     Neurological:      Mental Status: She is alert and oriented to person, place, and time.     Psychiatric:         Mood and Affect: Mood normal.         Behavior: Behavior normal.           "

## 2025-08-01 ENCOUNTER — HOSPITAL ENCOUNTER (OUTPATIENT)
Dept: NON INVASIVE DIAGNOSTICS | Facility: CLINIC | Age: 80
Discharge: HOME | End: 2025-08-01
Attending: INTERNAL MEDICINE
Payer: MEDICARE

## 2025-08-01 VITALS — BODY MASS INDEX: 21.48 KG/M2 | HEIGHT: 69 IN | WEIGHT: 145 LBS

## 2025-08-01 VITALS
HEIGHT: 69 IN | HEART RATE: 80 BPM | BODY MASS INDEX: 21.48 KG/M2 | DIASTOLIC BLOOD PRESSURE: 60 MMHG | SYSTOLIC BLOOD PRESSURE: 116 MMHG | WEIGHT: 145 LBS

## 2025-08-01 DIAGNOSIS — I48.0 PAROXYSMAL ATRIAL FIBRILLATION (HCC): ICD-10-CM

## 2025-08-01 DIAGNOSIS — F41.9 ANXIETY AND DEPRESSION: ICD-10-CM

## 2025-08-01 DIAGNOSIS — F32.A ANXIETY AND DEPRESSION: ICD-10-CM

## 2025-08-01 DIAGNOSIS — E78.2 MIXED HYPERLIPIDEMIA: ICD-10-CM

## 2025-08-01 DIAGNOSIS — I42.9 CARDIOMYOPATHY, UNSPECIFIED TYPE (HCC): ICD-10-CM

## 2025-08-01 LAB
AORTIC ROOT: 3 CM
ASCENDING AORTA: 3.4 CM
AV LVOT MEAN GRADIENT: 2 MMHG
AV LVOT PEAK GRADIENT: 3 MMHG
AV REGURGITATION PRESSURE HALF TIME: 426 MS
DOP CALC LVOT PEAK VEL VTI: 21.1 CM
DOP CALC LVOT PEAK VEL: 0.9 M/S
E WAVE DECELERATION TIME: 199 MS
E/A RATIO: 0.98
FRACTIONAL SHORTENING: 25 (ref 28–44)
INTERVENTRICULAR SEPTUM IN DIASTOLE (PARASTERNAL SHORT AXIS VIEW): 0.9 CM
INTERVENTRICULAR SEPTUM: 0.9 CM (ref 0.6–1.1)
LAAS-AP2: 19.8 CM2
LAAS-AP4: 20.6 CM2
LEFT ATRIUM SIZE: 4.5 CM
LEFT ATRIUM VOLUME (MOD BIPLANE): 55 ML
LEFT ATRIUM VOLUME INDEX (MOD BIPLANE): 30.6 ML/M2
LEFT INTERNAL DIMENSION IN SYSTOLE: 3.9 CM (ref 2.1–4)
LEFT VENTRICULAR INTERNAL DIMENSION IN DIASTOLE: 5.2 CM (ref 3.5–6)
LEFT VENTRICULAR POSTERIOR WALL IN END DIASTOLE: 0.6 CM
LEFT VENTRICULAR STROKE VOLUME: 66 ML
LV EF US.2D.A4C+ESTIMATED: 49 %
LVSV (TEICH): 66 ML
MAX HR PERCENT: 69 %
MAX HR: 97 BPM
MITRAL REGURGITATION PEAK VELOCITY: 5.03 M/S
MITRAL VALVE MEAN INFLOW VELOCITY: 4.24 M/S
MITRAL VALVE REGURGITANT PEAK GRADIENT: 101 MMHG
MV E'TISSUE VEL-SEP: 6 CM/S
MV PEAK A VEL: 0.93 M/S
MV PEAK E VEL: 91 CM/S
MV STENOSIS PRESSURE HALF TIME: 58 MS
MV VALVE AREA P 1/2 METHOD: 3.79
RA PRESSURE ESTIMATED: 15 MMHG
RATE PRESSURE PRODUCT: NORMAL
RIGHT ATRIUM AREA SYSTOLE A4C: 11.4 CM2
RIGHT VENTRICLE ID DIMENSION: 3.1 CM
RV PSP: 33 MMHG
SL CV AV DECELERATION TIME RETROGRADE: 1469 MS
SL CV AV PEAK GRADIENT RETROGRADE: 75 MMHG
SL CV DOP CALC MV VTI RETROGRADE: 200.2 CM
SL CV LEFT ATRIUM LENGTH A2C: 5.7 CM
SL CV LV EF: 50
SL CV MV MEAN GRADIENT RETROGRADE: 77 MMHG
SL CV PED ECHO LEFT VENTRICLE DIASTOLIC VOLUME (MOD BIPLANE) 2D: 130 ML
SL CV PED ECHO LEFT VENTRICLE SYSTOLIC VOLUME (MOD BIPLANE) 2D: 65 ML
SL CV REST NUCLEAR ISOTOPE DOSE: 10.69 MCI
SL CV STRESS NUCLEAR ISOTOPE DOSE: 31.6 MCI
SL CV STRESS RECOVERY BP: NORMAL MMHG
SL CV STRESS RECOVERY HR: 75 BPM
SL CV STRESS RECOVERY O2 SAT: 97 %
STRESS ANGINA INDEX: 0
STRESS BASELINE BP: NORMAL MMHG
STRESS BASELINE HR: 63 BPM
STRESS O2 SAT REST: 98 %
STRESS PEAK HR: 95 BPM
STRESS POST ESTIMATED WORKLOAD: 1.6 METS
STRESS POST EXERCISE DUR MIN: 3 MIN
STRESS POST EXERCISE DUR SEC: 0 SEC
STRESS POST O2 SAT PEAK: 98 %
STRESS POST PEAK BP: 144 MMHG
STRESS/REST PERFUSION RATIO: 0.95
TR MAX PG: 18 MMHG
TR PEAK VELOCITY: 2.1 M/S
TRICUSPID ANNULAR PLANE SYSTOLIC EXCURSION: 1.8 CM
TRICUSPID VALVE PEAK REGURGITATION VELOCITY: 2.12 M/S

## 2025-08-01 PROCEDURE — 93226 XTRNL ECG REC<48 HR SCAN A/R: CPT

## 2025-08-01 PROCEDURE — A9502 TC99M TETROFOSMIN: HCPCS

## 2025-08-01 PROCEDURE — 93017 CV STRESS TEST TRACING ONLY: CPT

## 2025-08-01 PROCEDURE — 93306 TTE W/DOPPLER COMPLETE: CPT | Performed by: INTERNAL MEDICINE

## 2025-08-01 PROCEDURE — 93225 XTRNL ECG REC<48 HRS REC: CPT

## 2025-08-01 PROCEDURE — 78452 HT MUSCLE IMAGE SPECT MULT: CPT

## 2025-08-01 PROCEDURE — 93016 CV STRESS TEST SUPVJ ONLY: CPT | Performed by: INTERNAL MEDICINE

## 2025-08-01 PROCEDURE — 93306 TTE W/DOPPLER COMPLETE: CPT

## 2025-08-01 PROCEDURE — 78452 HT MUSCLE IMAGE SPECT MULT: CPT | Performed by: INTERNAL MEDICINE

## 2025-08-01 PROCEDURE — 93018 CV STRESS TEST I&R ONLY: CPT | Performed by: INTERNAL MEDICINE

## 2025-08-01 RX ORDER — REGADENOSON 0.08 MG/ML
0.4 INJECTION, SOLUTION INTRAVENOUS ONCE
Status: COMPLETED | OUTPATIENT
Start: 2025-08-01 | End: 2025-08-01

## 2025-08-01 RX ADMIN — REGADENOSON 0.4 MG: 0.08 INJECTION, SOLUTION INTRAVENOUS at 14:06

## 2025-08-02 LAB
CHEST PAIN STATEMENT: NORMAL
MAX DIASTOLIC BP: 80 MMHG
MAX PREDICTED HEART RATE: 139 BPM
PROTOCOL NAME: NORMAL
REASON FOR TERMINATION: NORMAL
STRESS POST EXERCISE DUR MIN: 3 MIN
STRESS POST EXERCISE DUR SEC: 0 SEC
STRESS POST PEAK HR: 97 BPM
STRESS POST PEAK SYSTOLIC BP: 150 MMHG
TARGET HR FORMULA: NORMAL
TEST INDICATION: NORMAL

## 2025-08-08 ENCOUNTER — TELEPHONE (OUTPATIENT)
Age: 80
End: 2025-08-08